# Patient Record
Sex: FEMALE | Race: WHITE | NOT HISPANIC OR LATINO | Employment: FULL TIME | URBAN - METROPOLITAN AREA
[De-identification: names, ages, dates, MRNs, and addresses within clinical notes are randomized per-mention and may not be internally consistent; named-entity substitution may affect disease eponyms.]

---

## 2017-11-10 ENCOUNTER — APPOINTMENT (OUTPATIENT)
Dept: EMPLOYEE HEALTH | Facility: CLINIC | Age: 32
End: 2017-11-10
Payer: COMMERCIAL

## 2017-11-10 DIAGNOSIS — Z77.21 EXPOSURE TO BLOOD: Primary | ICD-10-CM

## 2017-11-10 LAB
HBV SURFACE AB SER QL: 14.2 MIU/ML
HIV 1+2 AB+HIV1 P24 AG SERPL QL IA: NORMAL

## 2017-11-10 PROCEDURE — 36415 COLL VENOUS BLD VENIPUNCTURE: CPT | Mod: EHP

## 2017-11-10 PROCEDURE — 87389 HIV-1 AG W/HIV-1&-2 AB AG IA: CPT | Mod: EHP

## 2017-11-10 PROCEDURE — 86706 HEP B SURFACE ANTIBODY: CPT | Mod: EHP

## 2018-01-12 ENCOUNTER — OFFICE VISIT (OUTPATIENT)
Dept: URGENT CARE | Facility: URGENT CARE | Age: 33
End: 2018-01-12
Payer: COMMERCIAL

## 2018-01-12 VITALS
RESPIRATION RATE: 16 BRPM | WEIGHT: 219 LBS | SYSTOLIC BLOOD PRESSURE: 142 MMHG | OXYGEN SATURATION: 97 % | DIASTOLIC BLOOD PRESSURE: 90 MMHG | TEMPERATURE: 97.3 F | HEART RATE: 98 BPM | BODY MASS INDEX: 37.39 KG/M2 | HEIGHT: 64 IN

## 2018-01-12 DIAGNOSIS — J32.9 BACTERIAL SINUSITIS: Primary | ICD-10-CM

## 2018-01-12 DIAGNOSIS — B96.89 BACTERIAL SINUSITIS: Primary | ICD-10-CM

## 2018-01-12 PROCEDURE — 99202 OFFICE O/P NEW SF 15 MIN: CPT | Mod: RUC | Performed by: NURSE PRACTITIONER

## 2018-01-12 RX ORDER — OMEPRAZOLE 20 MG/1
20 CAPSULE, DELAYED RELEASE ORAL DAILY
COMMUNITY
End: 2019-04-23 | Stop reason: ALTCHOICE

## 2018-01-12 RX ORDER — AMOXICILLIN AND CLAVULANATE POTASSIUM 875; 125 MG/1; MG/1
1 TABLET, FILM COATED ORAL 2 TIMES DAILY
Qty: 14 TABLET | Refills: 0 | Status: SHIPPED | OUTPATIENT
Start: 2018-01-12 | End: 2018-01-19

## 2018-01-12 ASSESSMENT — ENCOUNTER SYMPTOMS
FEVER: 0
COUGH: 1
CHILLS: 1
WHEEZING: 0
SORE THROAT: 1
SHORTNESS OF BREATH: 0
DIARRHEA: 0
NAUSEA: 0
CHEST TIGHTNESS: 1
VOMITING: 0
SINUS PRESSURE: 1

## 2018-01-12 ASSESSMENT — PAIN SCALES - GENERAL: PAINLEVEL: 5

## 2018-01-12 NOTE — PROGRESS NOTES
"Subjective      HPI    Marycruz Locke is a 32 y.o. female who presents for sinus congestion, headaches, and cough ×2 weeks.      Review of Systems   Constitutional: Positive for chills. Negative for fever.   HENT: Positive for congestion, postnasal drip, sinus pressure and sore throat. Negative for ear pain.    Respiratory: Positive for cough and chest tightness. Negative for shortness of breath and wheezing.    Cardiovascular: Negative for chest pain.   Gastrointestinal: Negative for diarrhea, nausea and vomiting.         Objective   /90 (BP Location: Left arm, Patient Position: Sitting, Cuff Size: Reg)   Pulse 98   Temp 36.3 °C (97.3 °F) (Temporal)   Resp 16   Ht 1.626 m (5' 4\")   Wt 99.3 kg (219 lb)   SpO2 97%   BMI 37.59 kg/m²     Physical Exam   Constitutional: She is oriented to person, place, and time. She appears well-developed and well-nourished. No distress.   HENT:   Head: Normocephalic and atraumatic.   Right Ear: External ear normal.   Left Ear: External ear normal.   Mouth/Throat: Uvula is midline and mucous membranes are normal. Uvula swelling present. Posterior oropharyngeal erythema present. No oropharyngeal exudate.   Eyes: Conjunctivae are normal. Pupils are equal, round, and reactive to light.   Neck: Normal range of motion.   Cardiovascular: Normal rate, regular rhythm and normal heart sounds.    Pulmonary/Chest: Effort normal and breath sounds normal.   Abdominal: Soft. Bowel sounds are normal.   Musculoskeletal: Normal range of motion.   Lymphadenopathy:     She has no cervical adenopathy.   Neurological: She is alert and oriented to person, place, and time.   Skin: Skin is warm.       No results found for this or any previous visit (from the past 4 hour(s)).      Assessment/Plan   Diagnoses and all orders for this visit:    Bacterial sinusitis  -     amoxicillin-pot clavulanate (AUGMENTIN) 875-125 mg per tablet; Take 1 tablet by mouth 2 (two) times a day for 7 " days.        Discussion:     Take antibiotic as prescribed. Use over the counter tylenol or ibuprofen for fever and discomfort.  May use saline rinses for congestion, hot steaming showers, and humidifier at nighttime.  Return to urgent care should you develop shortness of breath, chest pain with cough, inability to tolerate fluids, or worseing symptoms or concerns.  Patient agrees with plan.  Patient verbalized understanding and denies any  further questions or concerns at this time.        A voice recognition program was used to aid in medical record documentation. Sometimes words are printed not exactly as they were spoken. While efforts were made to carefully edit and correct any inaccuracies, some errors may be present and should be taken within the context of the discussion.  Please contact our office if you need assistance interpreting this medical record or notice any mistakes.      Jennifer G Franke, CNP

## 2018-03-02 ENCOUNTER — OFFICE VISIT (OUTPATIENT)
Dept: URGENT CARE | Facility: URGENT CARE | Age: 33
End: 2018-03-02
Payer: COMMERCIAL

## 2018-03-02 VITALS
RESPIRATION RATE: 18 BRPM | DIASTOLIC BLOOD PRESSURE: 70 MMHG | TEMPERATURE: 98.5 F | HEART RATE: 65 BPM | HEIGHT: 64 IN | BODY MASS INDEX: 35.85 KG/M2 | WEIGHT: 210 LBS | OXYGEN SATURATION: 100 % | SYSTOLIC BLOOD PRESSURE: 121 MMHG

## 2018-03-02 DIAGNOSIS — R68.89 INFLUENZA-LIKE SYMPTOMS: Primary | ICD-10-CM

## 2018-03-02 LAB
FLUAV AG NPH QL IA: NEGATIVE
FLUBV AG NPH QL IA: NEGATIVE

## 2018-03-02 PROCEDURE — 99213 OFFICE O/P EST LOW 20 MIN: CPT | Performed by: NURSE PRACTITIONER

## 2018-03-02 PROCEDURE — 87804 INFLUENZA ASSAY W/OPTIC: CPT | Mod: QW,59 | Performed by: NURSE PRACTITIONER

## 2018-03-02 ASSESSMENT — ENCOUNTER SYMPTOMS
MYALGIAS: 1
SORE THROAT: 0
SHORTNESS OF BREATH: 0
WHEEZING: 0
NAUSEA: 0
SINUS PRESSURE: 0
DIARRHEA: 0
COUGH: 1
CHILLS: 0
FEVER: 1
VOMITING: 0

## 2018-03-02 ASSESSMENT — PAIN SCALES - GENERAL: PAINLEVEL: 8

## 2018-03-02 NOTE — PROGRESS NOTES
"Subjective      HPI    Marycruz Locke is a 33 y.o. female who presents for fever ×1 day.  Admits to cough during the day.  Denies shortness of breath.  She has been exposed to influenza.      Review of Systems   Constitutional: Positive for fever. Negative for chills.   HENT: Negative for congestion, ear pain, postnasal drip, sinus pressure and sore throat.    Respiratory: Positive for cough. Negative for shortness of breath and wheezing.    Cardiovascular: Negative for chest pain.   Gastrointestinal: Negative for diarrhea, nausea and vomiting.   Musculoskeletal: Positive for myalgias.         Objective   /70 (BP Location: Right arm, Patient Position: Sitting, Cuff Size: Reg)   Pulse 65   Temp 36.9 °C (98.5 °F) (Temporal)   Resp 18   Ht 1.626 m (5' 4\")   Wt 95.3 kg (210 lb)   SpO2 100%   BMI 36.05 kg/m²     Physical Exam   Constitutional: She is oriented to person, place, and time. She appears well-developed and well-nourished. No distress.   HENT:   Head: Normocephalic and atraumatic.   Right Ear: External ear normal.   Left Ear: External ear normal.   Mouth/Throat: Uvula is midline and mucous membranes are normal. No oropharyngeal exudate or posterior oropharyngeal erythema.   Eyes: Conjunctivae are normal. Pupils are equal, round, and reactive to light.   Neck: Normal range of motion.   Cardiovascular: Normal rate, regular rhythm and normal heart sounds.    Pulmonary/Chest: Effort normal and breath sounds normal.   Abdominal: Soft. Bowel sounds are normal.   Musculoskeletal: Normal range of motion.   Lymphadenopathy:     She has no cervical adenopathy.   Neurological: She is alert and oriented to person, place, and time.   Skin: Skin is warm.   Nursing note and vitals reviewed.      No results found for this or any previous visit (from the past 4 hour(s)).    Assessment/Plan   Diagnoses and all orders for this visit:    Influenza-like symptoms  -     Rapid influenza A/B antigens        Discussion: "   Discussed with patient this appear to be viral.  Use over the counter tylenol or ibuprofen for fever and discomfort.  Sudafed during the day and Benadryl at nighttime for sinus congestion if not contraindicated.  May use saline rinses for congestion, hot steaming showers, and humidifier at nighttime.  Return to urgent care should you develop a fever greater than 101, shortness of breath, chest pain with cough, inability to tolerate fluids, or worseing symptoms or concerns.  Patient agrees with plan.  Patient verbalized understanding and denies any  further questions or concerns at this time.           Jennifer G Franke, CNP

## 2018-03-02 NOTE — LETTER
[facility]  [facility] [address]  [phone]  Dept: [phone]  March 2, 2018     Patient: Marycruz Locke   YOB: 1985   Date of Visit: 3/2/2018       To Whom It May Concern:    It is my medical opinion that Marycruz Locke Was seen in urgent care today.  Please excuse patient until fever free ×24 hours..    If you have any questions or concerns, please don't hesitate to call.         Sincerely,        Jennifer G Franke, CNP    CC: No Recipients

## 2018-03-24 ENCOUNTER — OFFICE VISIT (OUTPATIENT)
Dept: URGENT CARE | Facility: URGENT CARE | Age: 33
End: 2018-03-24
Payer: COMMERCIAL

## 2018-03-24 VITALS
RESPIRATION RATE: 18 BRPM | HEART RATE: 67 BPM | SYSTOLIC BLOOD PRESSURE: 127 MMHG | WEIGHT: 214 LBS | DIASTOLIC BLOOD PRESSURE: 79 MMHG | BODY MASS INDEX: 36.73 KG/M2 | TEMPERATURE: 97.4 F | OXYGEN SATURATION: 98 %

## 2018-03-24 DIAGNOSIS — O21.9 NAUSEA AND VOMITING DURING PREGNANCY: Primary | ICD-10-CM

## 2018-03-24 PROCEDURE — 99213 OFFICE O/P EST LOW 20 MIN: CPT | Performed by: NURSE PRACTITIONER

## 2018-03-24 RX ORDER — METOCLOPRAMIDE 5 MG/1
5 TABLET ORAL 4 TIMES DAILY
Qty: 40 TABLET | Refills: 0 | Status: SHIPPED | OUTPATIENT
Start: 2018-03-24 | End: 2018-04-03

## 2018-03-24 ASSESSMENT — ENCOUNTER SYMPTOMS
WOUND: 0
HEADACHES: 1
VOMITING: 1
COUGH: 0
FLANK PAIN: 0
SHORTNESS OF BREATH: 0
DYSURIA: 0
NUMBNESS: 0
DIZZINESS: 0
CONSTIPATION: 1
WEAKNESS: 0
COLOR CHANGE: 0
FEVER: 0
PALPITATIONS: 0
CHILLS: 0
FREQUENCY: 0
ABDOMINAL PAIN: 0
DIARRHEA: 0
BLOOD IN STOOL: 0
NAUSEA: 1

## 2018-03-24 ASSESSMENT — PAIN SCALES - GENERAL: PAINLEVEL: 1

## 2018-03-24 NOTE — PROGRESS NOTES
[place]      KIRIT Locke is a 33 y.o. female who presents for nausea and vomiting ×1 week.  Patient states she is 16 weeks pregnant and starting her second trimester had relief of her nausea.  Approximately 1 week ago patient states she developed nausea and vomiting that is intermittent.  Denies any abdominal pain.  Admits to intermittent constipation but denies diarrhea.  States she has been taking Zofran as needed with minimal relief.  Has been able to eat small bland meals and tolerate fluids.  She states she has noticed she cannot tolerate his water.      Review of Systems   Constitutional: Negative for chills and fever.   Respiratory: Negative for cough and shortness of breath.    Cardiovascular: Negative for chest pain and palpitations.   Gastrointestinal: Positive for constipation, nausea and vomiting. Negative for abdominal pain, blood in stool and diarrhea.   Genitourinary: Negative for dysuria, flank pain, frequency, pelvic pain and urgency.   Skin: Negative for color change, pallor, rash and wound.   Neurological: Positive for headaches. Negative for dizziness, syncope, weakness and numbness.         Objective   /79 (BP Location: Right arm, Patient Position: Sitting, Cuff Size: Reg)   Pulse 67   Temp 36.3 °C (97.4 °F) (Temporal)   Resp 18   Wt 97.1 kg (214 lb)   LMP  (Within Weeks)   SpO2 98%   BMI 36.73 kg/m²     Physical Exam   Constitutional: She is oriented to person, place, and time. She appears well-developed and well-nourished. No distress.   HENT:   Head: Normocephalic and atraumatic.   Eyes: Pupils are equal, round, and reactive to light.   Neck: Normal range of motion. Neck supple.   Cardiovascular: Normal rate, regular rhythm, normal heart sounds and intact distal pulses.    No murmur heard.  Pulmonary/Chest: Effort normal and breath sounds normal.   Abdominal: Soft. Normal appearance and bowel sounds are normal. She exhibits no distension, no abdominal bruit and no  mass. There is no hepatosplenomegaly. There is no tenderness. There is no rebound, no guarding, no CVA tenderness, no tenderness at McBurney's point and negative Ayala's sign. No hernia.   Musculoskeletal: Normal range of motion.   Lymphadenopathy:     She has no cervical adenopathy.   Neurological: She is alert and oriented to person, place, and time.   Skin: Skin is warm and dry. No rash noted. She is not diaphoretic. No erythema.   Psychiatric: She has a normal mood and affect. Her behavior is normal. Judgment and thought content normal.   Nursing note and vitals reviewed.      No results found for this or any previous visit (from the past 4 hour(s)).    Assessment/Plan   Diagnoses and all orders for this visit:    Nausea and vomiting during pregnancy  -     metoclopramide (REGLAN) 5 mg tablet; Take 1 tablet (5 mg total) by mouth 4 (four) times a day for 10 days.        Discussion:   Discussed with patient may trial Reglan.  She is to follow-up with her OB/GYN Dr. Miranda on Monday.  Education provided to consider small frequent meals and to avoid milk products, ice cold drinks, and spicy foods.  Encourage patient to have tenzin crackers that of the bed to eat 1 prior to getting up.  Encourage patient to push fluids and return to urgent care or emergency department should she develop intractable vomiting, abdominal pain, worsening symptoms or concerns.  Patient agrees with plan.  Patient verbalized understanding and denies any  further questions or concerns at this time.           Jennifer G Franke, CNP

## 2018-03-24 NOTE — LETTER
[facility]  [address]  [facility] [address]  [phone]  Dept: [phone]  March 24, 2018     Patient: Marycruz Locke   YOB: 1985   Date of Visit: 3/24/2018       To Whom It May Concern:    It is my medical opinion that Marycruz Locke should remain out of work until 03/25/18 if continued nausea and vomiting should occur..    If you have any questions or concerns, please don't hesitate to call.         Sincerely,        Jennifer G Franke, CNP    CC: No Recipients

## 2018-03-26 LAB
ABO GROUP BLD: NORMAL
D AG BLD QL: POSITIVE
EXTERNAL ANTIBODY SCREEN: NEGATIVE
EXTERNAL HEPATITIS B SURFACE ANTIGEN: NONREACTIVE
EXTERNAL HIV 1+2 AB + HIV1 P24 AG (PRESENCE) IN SERUM: NONREACTIVE
EXTERNAL HIV 1+2 AB/P24 AG SERUM: NONREACTIVE
EXTERNAL RUBELLA ANTIBODY, IGG: NORMAL

## 2018-06-01 ENCOUNTER — HOSPITAL ENCOUNTER (OUTPATIENT)
Facility: HOSPITAL | Age: 33
Discharge: HOME | End: 2018-06-01
Attending: OBSTETRICS & GYNECOLOGY | Admitting: OBSTETRICS & GYNECOLOGY
Payer: COMMERCIAL

## 2018-06-01 VITALS
OXYGEN SATURATION: 99 % | HEART RATE: 57 BPM | SYSTOLIC BLOOD PRESSURE: 108 MMHG | TEMPERATURE: 98.4 F | RESPIRATION RATE: 20 BRPM | DIASTOLIC BLOOD PRESSURE: 63 MMHG

## 2018-06-01 LAB
ALBUMIN SERPL-MCNC: 3.2 G/DL (ref 3.5–5.3)
ALP SERPL-CCNC: 80 U/L (ref 37–98)
ALT SERPL-CCNC: 20 U/L (ref 0–52)
ANION GAP SERPL CALC-SCNC: 9 MMOL/L (ref 3–11)
AST SERPL-CCNC: 18 U/L (ref 0–39)
BACTERIA #/AREA URNS AUTO: NORMAL /HPF
BILIRUB SERPL-MCNC: 0.28 MG/DL (ref 0–1.4)
BILIRUB UR QL STRIP.AUTO: NEGATIVE
BUN SERPL-MCNC: 6 MG/DL (ref 7–25)
CALCIUM ALBUM COR SERPL-MCNC: 9.1 MG/DL (ref 8.5–10.1)
CALCIUM SERPL-MCNC: 8.5 MG/DL (ref 8.6–10.3)
CHLORIDE SERPL-SCNC: 107 MMOL/L (ref 98–107)
CLARITY UR: CLEAR
CO2 SERPL-SCNC: 22 MMOL/L (ref 21–32)
COLOR UR: NORMAL
CREAT SERPL-MCNC: 0.5 MG/DL (ref 0.6–1.2)
ERYTHROCYTE [DISTWIDTH] IN BLOOD BY AUTOMATED COUNT: 14.2 % (ref 11.5–14)
GFR SERPL CREATININE-BSD FRML MDRD: 144 ML/MIN/1.73M*2
GLUCOSE SERPL-MCNC: 85 MG/DL (ref 70–105)
GLUCOSE UR STRIP.AUTO-MCNC: NEGATIVE MG/DL
HCT VFR BLD AUTO: 33.2 % (ref 34–45)
HGB BLD-MCNC: 11.8 G/DL (ref 11.5–15.5)
HGB UR QL STRIP.AUTO: NEGATIVE
KETONES UR STRIP.AUTO-MCNC: NEGATIVE MG/DL
LEUKOCYTE ESTERASE UR QL STRIP: NEGATIVE
MCH RBC QN AUTO: 30.7 PG (ref 28–33)
MCHC RBC AUTO-ENTMCNC: 35.4 G/DL (ref 32–36)
MCV RBC AUTO: 86.8 FL (ref 81–97)
NITRITE UR QL STRIP.AUTO: NEGATIVE
PH UR STRIP.AUTO: 7 PH
PLATELET # BLD AUTO: 179 10*3/UL (ref 140–350)
PMV BLD AUTO: 10.1 FL (ref 6.9–10.8)
POTASSIUM SERPL-SCNC: 3.9 MMOL/L (ref 3.5–5.1)
PROT SERPL-MCNC: 5.8 G/DL (ref 6–8.3)
PROT UR STRIP.AUTO-MCNC: NEGATIVE MG/DL
RBC # BLD AUTO: 3.83 10*6/ΜL (ref 3.7–5.3)
RBC #/AREA URNS AUTO: NEGATIVE /HPF
SODIUM SERPL-SCNC: 138 MMOL/L (ref 135–145)
SP GR UR STRIP.AUTO: 1 (ref 1–1.03)
SQUAMOUS #/AREA URNS AUTO: NEGATIVE /HPF
UROBILINOGEN UR STRIP.AUTO-MCNC: <2 E.U./DL
WBC # BLD AUTO: 9.7 10*3/UL (ref 4.5–10.5)
WBC #/AREA URNS AUTO: NORMAL /HPF

## 2018-06-01 PROCEDURE — 2580000300 HC RX 258: Performed by: OBSTETRICS & GYNECOLOGY

## 2018-06-01 PROCEDURE — 99213 OFFICE O/P EST LOW 20 MIN: CPT

## 2018-06-01 PROCEDURE — 80053 COMPREHEN METABOLIC PANEL: CPT | Performed by: OBSTETRICS & GYNECOLOGY

## 2018-06-01 PROCEDURE — 85027 COMPLETE CBC AUTOMATED: CPT | Performed by: OBSTETRICS & GYNECOLOGY

## 2018-06-01 PROCEDURE — 81001 URINALYSIS AUTO W/SCOPE: CPT | Performed by: OBSTETRICS & GYNECOLOGY

## 2018-06-01 PROCEDURE — 36415 COLL VENOUS BLD VENIPUNCTURE: CPT | Performed by: OBSTETRICS & GYNECOLOGY

## 2018-06-01 PROCEDURE — 6370000100 HC RX 637 (ALT 250 FOR IP): Performed by: OBSTETRICS & GYNECOLOGY

## 2018-06-01 PROCEDURE — 6360000200 HC RX 636 W HCPCS (ALT 250 FOR IP): Performed by: OBSTETRICS & GYNECOLOGY

## 2018-06-01 PROCEDURE — 96374 THER/PROPH/DIAG INJ IV PUSH: CPT

## 2018-06-01 PROCEDURE — G0463 HOSPITAL OUTPT CLINIC VISIT: HCPCS | Mod: 25

## 2018-06-01 RX ORDER — ONDANSETRON HYDROCHLORIDE 2 MG/ML
4 INJECTION, SOLUTION INTRAVENOUS ONCE
Status: COMPLETED | OUTPATIENT
Start: 2018-06-01 | End: 2018-06-01

## 2018-06-01 RX ORDER — FAMOTIDINE 20 MG/1
20 TABLET, FILM COATED ORAL ONCE
Status: COMPLETED | OUTPATIENT
Start: 2018-06-01 | End: 2018-06-01

## 2018-06-01 RX ORDER — ACETAMINOPHEN 500 MG
1000 TABLET ORAL EVERY 6 HOURS PRN
Status: DISCONTINUED | OUTPATIENT
Start: 2018-06-01 | End: 2018-06-01 | Stop reason: HOSPADM

## 2018-06-01 RX ORDER — SODIUM CHLORIDE, SODIUM LACTATE, POTASSIUM CHLORIDE, AND CALCIUM CHLORIDE .6; .31; .03; .02 G/100ML; G/100ML; G/100ML; G/100ML
1000 INJECTION, SOLUTION INTRAVENOUS ONCE
Status: COMPLETED | OUTPATIENT
Start: 2018-06-01 | End: 2018-06-01

## 2018-06-01 RX ADMIN — SODIUM CHLORIDE, POTASSIUM CHLORIDE, SODIUM LACTATE AND CALCIUM CHLORIDE 1000 ML: 600; 310; 30; 20 INJECTION, SOLUTION INTRAVENOUS at 08:13

## 2018-06-01 RX ADMIN — FAMOTIDINE 20 MG: 20 TABLET ORAL at 08:30

## 2018-06-01 RX ADMIN — ONDANSETRON HYDROCHLORIDE 4 MG: 2 INJECTION INTRAMUSCULAR; INTRAVENOUS at 08:11

## 2018-06-01 RX ADMIN — Medication 1000 MG: at 08:49

## 2018-06-01 NOTE — DISCHARGE INSTRUCTIONS
You should return to the hospital or contact your provider for any of the following:    · You have noticed a decrease in your baby's activity level, less than 5 movements in one hour after 24 weeks.    · You are bleeding from the vagina. You will have a small amount of bloody discharge after a vaginal exam. This is normal.    · You have a gush of fluid or are leaking fluid.    · Foul smelling vaginal discharge.    · Regular, painful, uterine contractions, lasting 45-60 seconds, every 3-4 minutes if you are more than 37 weeks.    · If less than 37 weeks and having 4 or more contractions per hour.     · Severe headache, blurred vision, spots in front of your eyes.    · Sudden weight gain or swelling in your face, hands, or feet.    · Persistent discomfort across your upper abdomen.    · Burning when you urinate.  Increased frequency or urgent sensation to urinate.    · You have a temperature of 100.5 degrees Fahrenheit or greater.    · Nausea or vomiting for more than 24 hours and unable to keep down fluids.    · Your abdomen becomes hard and does not relax.    · Severe abdominal pain.    · If you experience any abdominal trauma.    Zofran as needed.     Pepcid or Heartburn medications over the counter as needed.     BRAT (bananas, rice, applesauce and toast)    Oral fluids    Follow up with Labor room this weekend or Clinic Monday if Nausea, vomitting and HA do not improve.

## 2018-06-18 LAB — EXTERNAL GLUCOSE MG/DL IN SER/PLAS GESTATIONAL SCREEN: 135 MG/DL (ref 70–140)

## 2018-08-01 ENCOUNTER — HOSPITAL ENCOUNTER (OUTPATIENT)
Facility: HOSPITAL | Age: 33
Discharge: HOME | End: 2018-08-01
Attending: OBSTETRICS & GYNECOLOGY | Admitting: OBSTETRICS & GYNECOLOGY
Payer: COMMERCIAL

## 2018-08-01 VITALS
WEIGHT: 229.28 LBS | SYSTOLIC BLOOD PRESSURE: 123 MMHG | HEART RATE: 64 BPM | OXYGEN SATURATION: 98 % | BODY MASS INDEX: 40.62 KG/M2 | HEIGHT: 63 IN | DIASTOLIC BLOOD PRESSURE: 71 MMHG

## 2018-08-01 LAB
ALBUMIN SERPL-MCNC: 3.1 G/DL (ref 3.5–5.3)
ALP SERPL-CCNC: 109 U/L (ref 37–98)
ALT SERPL-CCNC: 8 U/L (ref 0–52)
ANION GAP SERPL CALC-SCNC: 9 MMOL/L (ref 3–11)
AST SERPL-CCNC: 11 U/L (ref 0–39)
BILIRUB SERPL-MCNC: 0.36 MG/DL (ref 0–1.4)
BUN SERPL-MCNC: 7 MG/DL (ref 7–25)
CALCIUM ALBUM COR SERPL-MCNC: 9.5 MG/DL (ref 8.5–10.1)
CALCIUM SERPL-MCNC: 8.8 MG/DL (ref 8.6–10.3)
CHLORIDE SERPL-SCNC: 106 MMOL/L (ref 98–107)
CO2 SERPL-SCNC: 19 MMOL/L (ref 21–32)
CREAT SERPL-MCNC: 0.5 MG/DL (ref 0.6–1.2)
D DIMER PPP FEU-MCNC: 0.31 UG/ML FEU (ref 0–0.5)
ERYTHROCYTE [DISTWIDTH] IN BLOOD BY AUTOMATED COUNT: 13.9 % (ref 11.5–14)
GFR SERPL CREATININE-BSD FRML MDRD: 144 ML/MIN/1.73M*2
GLUCOSE SERPL-MCNC: 121 MG/DL (ref 70–105)
HCT VFR BLD AUTO: 35.1 % (ref 34–45)
HGB BLD-MCNC: 12.4 G/DL (ref 11.5–15.5)
MCH RBC QN AUTO: 30.4 PG (ref 28–33)
MCHC RBC AUTO-ENTMCNC: 35.4 G/DL (ref 32–36)
MCV RBC AUTO: 85.8 FL (ref 81–97)
PLATELET # BLD AUTO: 161 10*3/UL (ref 140–350)
PMV BLD AUTO: 10.7 FL (ref 6.9–10.8)
POTASSIUM SERPL-SCNC: 3.5 MMOL/L (ref 3.5–5.1)
PROT SERPL-MCNC: 6 G/DL (ref 6–8.3)
RBC # BLD AUTO: 4.09 10*6/ΜL (ref 3.7–5.3)
SODIUM SERPL-SCNC: 134 MMOL/L (ref 135–145)
WBC # BLD AUTO: 10.2 10*3/UL (ref 4.5–10.5)

## 2018-08-01 PROCEDURE — 85379 FIBRIN DEGRADATION QUANT: CPT | Performed by: OBSTETRICS & GYNECOLOGY

## 2018-08-01 PROCEDURE — 80053 COMPREHEN METABOLIC PANEL: CPT | Performed by: OBSTETRICS & GYNECOLOGY

## 2018-08-01 PROCEDURE — 85027 COMPLETE CBC AUTOMATED: CPT | Performed by: OBSTETRICS & GYNECOLOGY

## 2018-08-01 PROCEDURE — 2580000300 HC RX 258: Performed by: OBSTETRICS & GYNECOLOGY

## 2018-08-01 PROCEDURE — 99214 OFFICE O/P EST MOD 30 MIN: CPT | Performed by: OBSTETRICS & GYNECOLOGY

## 2018-08-01 PROCEDURE — 99213 OFFICE O/P EST LOW 20 MIN: CPT

## 2018-08-01 RX ORDER — SODIUM CHLORIDE, SODIUM LACTATE, POTASSIUM CHLORIDE, AND CALCIUM CHLORIDE .6; .31; .03; .02 G/100ML; G/100ML; G/100ML; G/100ML
1000 INJECTION, SOLUTION INTRAVENOUS ONCE
Status: COMPLETED | OUTPATIENT
Start: 2018-08-01 | End: 2018-08-01

## 2018-08-01 RX ADMIN — SODIUM CHLORIDE, POTASSIUM CHLORIDE, SODIUM LACTATE AND CALCIUM CHLORIDE 1000 ML: 600; 310; 30; 20 INJECTION, SOLUTION INTRAVENOUS at 10:15

## 2018-08-01 NOTE — DISCHARGE INSTRUCTIONS
You should return to the hospital or contact your provider for any of the following:    · You have noticed a decrease in your baby's activity level, less than 5 movements in one hour after 24 weeks.    · You are bleeding from the vagina. You will have a small amount of bloody discharge after a vaginal exam. This is normal.    · You have a gush of fluid or are leaking fluid.    · Foul smelling vaginal discharge.    · Regular, painful, uterine contractions, lasting 45-60 seconds, every 3-4 minutes if you are more than 37 weeks.    · If less than 37 weeks and having 4 or more contractions per hour.     · Severe headache, blurred vision, spots in front of your eyes.    · Sudden weight gain or swelling in your face, hands, or feet.    · Persistent discomfort across your upper abdomen.    · Burning when you urinate.  Increased frequency or urgent sensation to urinate.    · You have a temperature of 100.5 degrees Fahrenheit or greater.    · Nausea or vomiting for more than 24 hours and unable to keep down fluids.    · Your abdomen becomes hard and does not relax.    · Severe abdominal pain.    · If you experience any abdominal trauma.

## 2018-08-16 LAB — EXTERNAL GROUP B STREP DNA: NEGATIVE

## 2018-09-07 NOTE — PRE-PROCEDURE INSTRUCTIONS
Pre-Surgery Instructions:   Medication Instructions   • omeprazole (PriLOSEC) 20 mg capsule Take morning of surgery/procedure   • SE-CRISSY 19, WITH DOCUSATE, 29 mg iron- 1 mg-25 mg tablet Do not take morning of surgery/procedure

## 2018-09-11 ENCOUNTER — HOSPITAL ENCOUNTER (INPATIENT)
Facility: HOSPITAL | Age: 33
LOS: 2 days | Discharge: HOME | End: 2018-09-13
Attending: OBSTETRICS & GYNECOLOGY | Admitting: OBSTETRICS & GYNECOLOGY
Payer: COMMERCIAL

## 2018-09-11 ENCOUNTER — ANESTHESIA EVENT (OUTPATIENT)
Dept: OBSTETRICS AND GYNECOLOGY | Facility: HOSPITAL | Age: 33
End: 2018-09-11
Payer: COMMERCIAL

## 2018-09-11 ENCOUNTER — ANESTHESIA (OUTPATIENT)
Dept: OBSTETRICS AND GYNECOLOGY | Facility: HOSPITAL | Age: 33
End: 2018-09-11
Payer: COMMERCIAL

## 2018-09-11 LAB
ABO GROUP (TYPE) IN BLOOD: NORMAL
ANTIBODY SCREEN: NORMAL
D AG BLD QL: NORMAL
ERYTHROCYTE [DISTWIDTH] IN BLOOD BY AUTOMATED COUNT: 14 % (ref 11.5–14)
EXTERNAL CHLAMYDIA TRACHOMATIS DNA PROBE: NORMAL
EXTERNAL NEISSERIA GONORRHOEAE DNA PROBE: NORMAL
HCT VFR BLD AUTO: 35.4 % (ref 34–45)
HGB BLD-MCNC: 12.7 G/DL (ref 11.5–15.5)
MCH RBC QN AUTO: 30.6 PG (ref 28–33)
MCHC RBC AUTO-ENTMCNC: 35.9 G/DL (ref 32–36)
MCV RBC AUTO: 85.2 FL (ref 81–97)
PLATELET # BLD AUTO: 159 10*3/UL (ref 140–350)
PMV BLD AUTO: 10.9 FL (ref 6.9–10.8)
RBC # BLD AUTO: 4.16 10*6/ΜL (ref 3.7–5.3)
T PALLIDUM IGG SER QL IA: NORMAL
WBC # BLD AUTO: 9.6 10*3/UL (ref 4.5–10.5)

## 2018-09-11 PROCEDURE — 59514 CESAREAN DELIVERY ONLY: CPT | Performed by: OBSTETRICS & GYNECOLOGY

## 2018-09-11 PROCEDURE — 2500000200 HC RX 250 WO HCPCS: Performed by: ANESTHESIOLOGY

## 2018-09-11 PROCEDURE — (BLANK) HC RECOVERY PHASE-1 OB EACH ADDITIONAL 1/2  HOUR ACUITY LEVEL 1: Performed by: OBSTETRICS & GYNECOLOGY

## 2018-09-11 PROCEDURE — 6370000100 HC RX 637 (ALT 250 FOR IP): Performed by: ANESTHESIOLOGY

## 2018-09-11 PROCEDURE — 2580000300 HC RX 258: Performed by: OBSTETRICS & GYNECOLOGY

## 2018-09-11 PROCEDURE — 86780 TREPONEMA PALLIDUM: CPT | Performed by: OBSTETRICS & GYNECOLOGY

## 2018-09-11 PROCEDURE — S0028 INJECTION, FAMOTIDINE, 20 MG: HCPCS | Performed by: ANESTHESIOLOGY

## 2018-09-11 PROCEDURE — 2590000100 HC RX 259: Performed by: OBSTETRICS & GYNECOLOGY

## 2018-09-11 PROCEDURE — 6360000200 HC RX 636 W HCPCS (ALT 250 FOR IP): Performed by: NURSE ANESTHETIST, CERTIFIED REGISTERED

## 2018-09-11 PROCEDURE — (BLANK) HC RECOVERY PHASE-1 OB 1ST HOUR ACUITY LEVEL 1: Performed by: OBSTETRICS & GYNECOLOGY

## 2018-09-11 PROCEDURE — 6360000200 HC RX 636 W HCPCS (ALT 250 FOR IP): Performed by: OBSTETRICS & GYNECOLOGY

## 2018-09-11 PROCEDURE — 2580000300 HC RX 258: Performed by: NURSE ANESTHETIST, CERTIFIED REGISTERED

## 2018-09-11 PROCEDURE — 85027 COMPLETE CBC AUTOMATED: CPT | Performed by: OBSTETRICS & GYNECOLOGY

## 2018-09-11 PROCEDURE — 86900 BLOOD TYPING SEROLOGIC ABO: CPT | Performed by: OBSTETRICS & GYNECOLOGY

## 2018-09-11 PROCEDURE — 01961 ANES CESAREAN DELIVERY ONLY: CPT | Performed by: NURSE ANESTHETIST, CERTIFIED REGISTERED

## 2018-09-11 PROCEDURE — 6360000200 HC RX 636 W HCPCS (ALT 250 FOR IP): Performed by: ANESTHESIOLOGY

## 2018-09-11 PROCEDURE — 36415 COLL VENOUS BLD VENIPUNCTURE: CPT | Performed by: OBSTETRICS & GYNECOLOGY

## 2018-09-11 PROCEDURE — (BLANK) HC ROOM PRIVATE OBSTETRICS

## 2018-09-11 PROCEDURE — 2580000300 HC RX 258: Performed by: ANESTHESIOLOGY

## 2018-09-11 RX ORDER — OXYTOCIN/0.9 % SODIUM CHLORIDE 30/500 ML
250 PLASTIC BAG, INJECTION (ML) INTRAVENOUS CONTINUOUS
Status: DISPENSED | OUTPATIENT
Start: 2018-09-11 | End: 2018-09-11

## 2018-09-11 RX ORDER — MORPHINE SULFATE 2 MG/ML
4 INJECTION, SOLUTION INTRAMUSCULAR; INTRAVENOUS
Status: DISPENSED | OUTPATIENT
Start: 2018-09-11 | End: 2018-09-12

## 2018-09-11 RX ORDER — MORPHINE SULFATE 2 MG/ML
4 INJECTION, SOLUTION INTRAMUSCULAR; INTRAVENOUS
Status: ACTIVE | OUTPATIENT
Start: 2018-09-11 | End: 2018-09-12

## 2018-09-11 RX ORDER — SODIUM CHLORIDE, SODIUM LACTATE, POTASSIUM CHLORIDE, AND CALCIUM CHLORIDE .6; .31; .03; .02 G/100ML; G/100ML; G/100ML; G/100ML
1000 INJECTION, SOLUTION INTRAVENOUS ONCE
Status: COMPLETED | OUTPATIENT
Start: 2018-09-11 | End: 2018-09-11

## 2018-09-11 RX ORDER — OXYCODONE AND ACETAMINOPHEN 5; 325 MG/1; MG/1
2 TABLET ORAL EVERY 4 HOURS PRN
Status: DISCONTINUED | OUTPATIENT
Start: 2018-09-11 | End: 2018-09-13 | Stop reason: HOSPADM

## 2018-09-11 RX ORDER — SODIUM CHLORIDE, SODIUM LACTATE, POTASSIUM CHLORIDE, CALCIUM CHLORIDE 600; 310; 30; 20 MG/100ML; MG/100ML; MG/100ML; MG/100ML
125 INJECTION, SOLUTION INTRAVENOUS CONTINUOUS
Status: DISCONTINUED | OUTPATIENT
Start: 2018-09-11 | End: 2018-09-13 | Stop reason: HOSPADM

## 2018-09-11 RX ORDER — METOCLOPRAMIDE HYDROCHLORIDE 5 MG/ML
10 INJECTION INTRAMUSCULAR; INTRAVENOUS ONCE
Status: COMPLETED | OUTPATIENT
Start: 2018-09-11 | End: 2018-09-11

## 2018-09-11 RX ORDER — SODIUM CHLORIDE 9 MG/ML
500 INJECTION, SOLUTION INTRAVENOUS ONCE AS NEEDED
Status: COMPLETED | OUTPATIENT
Start: 2018-09-11 | End: 2018-09-11

## 2018-09-11 RX ORDER — CEFAZOLIN SODIUM 10 G/1
2000 INJECTION, POWDER, FOR SOLUTION INTRAVENOUS ONCE
Status: DISCONTINUED | OUTPATIENT
Start: 2018-09-11 | End: 2018-09-11 | Stop reason: HOSPADM

## 2018-09-11 RX ORDER — DIPHENHYDRAMINE HYDROCHLORIDE 50 MG/ML
25 INJECTION INTRAMUSCULAR; INTRAVENOUS EVERY 6 HOURS PRN
Status: DISPENSED | OUTPATIENT
Start: 2018-09-11 | End: 2018-09-12

## 2018-09-11 RX ORDER — ONDANSETRON HYDROCHLORIDE 2 MG/ML
INJECTION, SOLUTION INTRAVENOUS AS NEEDED
Status: DISCONTINUED | OUTPATIENT
Start: 2018-09-11 | End: 2018-09-11 | Stop reason: SURG

## 2018-09-11 RX ORDER — CEFAZOLIN SODIUM 1 G/3ML
INJECTION, POWDER, FOR SOLUTION INTRAMUSCULAR; INTRAVENOUS AS NEEDED
Status: DISCONTINUED | OUTPATIENT
Start: 2018-09-11 | End: 2018-09-11 | Stop reason: SURG

## 2018-09-11 RX ORDER — ACETAMINOPHEN 500 MG
500 TABLET ORAL EVERY 4 HOURS PRN
Status: DISCONTINUED | OUTPATIENT
Start: 2018-09-11 | End: 2018-09-13 | Stop reason: HOSPADM

## 2018-09-11 RX ORDER — FAMOTIDINE 10 MG/ML
INJECTION INTRAVENOUS
Status: DISCONTINUED
Start: 2018-09-11 | End: 2018-09-13 | Stop reason: HOSPADM

## 2018-09-11 RX ORDER — SODIUM CITRATE AND CITRIC ACID MONOHYDRATE 334; 500 MG/5ML; MG/5ML
30 SOLUTION ORAL ONCE
Status: COMPLETED | OUTPATIENT
Start: 2018-09-11 | End: 2018-09-11

## 2018-09-11 RX ORDER — ONDANSETRON HYDROCHLORIDE 2 MG/ML
4 INJECTION, SOLUTION INTRAVENOUS EVERY 6 HOURS PRN
Status: DISCONTINUED | OUTPATIENT
Start: 2018-09-11 | End: 2018-09-13 | Stop reason: HOSPADM

## 2018-09-11 RX ORDER — KETOROLAC TROMETHAMINE 30 MG/ML
30 INJECTION, SOLUTION INTRAMUSCULAR; INTRAVENOUS EVERY 6 HOURS
Status: DISPENSED | OUTPATIENT
Start: 2018-09-11 | End: 2018-09-12

## 2018-09-11 RX ORDER — SIMETHICONE 80 MG
80 TABLET,CHEWABLE ORAL EVERY 4 HOURS PRN
Status: DISCONTINUED | OUTPATIENT
Start: 2018-09-11 | End: 2018-09-13 | Stop reason: HOSPADM

## 2018-09-11 RX ORDER — SODIUM CITRATE AND CITRIC ACID MONOHYDRATE 334; 500 MG/5ML; MG/5ML
SOLUTION ORAL
Status: DISCONTINUED
Start: 2018-09-11 | End: 2018-09-13 | Stop reason: HOSPADM

## 2018-09-11 RX ORDER — AMMONIA 15 % (W/V)
1 AMPUL (EA) INHALATION AS NEEDED
Status: DISCONTINUED | OUTPATIENT
Start: 2018-09-11 | End: 2018-09-13 | Stop reason: HOSPADM

## 2018-09-11 RX ORDER — DIPHENHYDRAMINE HYDROCHLORIDE 50 MG/ML
25-50 INJECTION INTRAMUSCULAR; INTRAVENOUS EVERY 6 HOURS PRN
Status: DISCONTINUED | OUTPATIENT
Start: 2018-09-11 | End: 2018-09-13 | Stop reason: HOSPADM

## 2018-09-11 RX ORDER — DOCUSATE SODIUM 100 MG/1
100 CAPSULE, LIQUID FILLED ORAL 2 TIMES DAILY
Status: DISCONTINUED | OUTPATIENT
Start: 2018-09-11 | End: 2018-09-13 | Stop reason: HOSPADM

## 2018-09-11 RX ORDER — FAMOTIDINE 10 MG/ML
20 INJECTION INTRAVENOUS ONCE
Status: COMPLETED | OUTPATIENT
Start: 2018-09-11 | End: 2018-09-11

## 2018-09-11 RX ORDER — DIPHENHYDRAMINE HCL 25 MG
25-50 CAPSULE ORAL EVERY 6 HOURS PRN
Status: DISCONTINUED | OUTPATIENT
Start: 2018-09-11 | End: 2018-09-13 | Stop reason: HOSPADM

## 2018-09-11 RX ORDER — SODIUM CHLORIDE, SODIUM LACTATE, POTASSIUM CHLORIDE, CALCIUM CHLORIDE 600; 310; 30; 20 MG/100ML; MG/100ML; MG/100ML; MG/100ML
250 INJECTION, SOLUTION INTRAVENOUS CONTINUOUS
Status: DISCONTINUED | OUTPATIENT
Start: 2018-09-11 | End: 2018-09-11

## 2018-09-11 RX ORDER — OXYCODONE AND ACETAMINOPHEN 5; 325 MG/1; MG/1
1 TABLET ORAL EVERY 4 HOURS PRN
Status: DISCONTINUED | OUTPATIENT
Start: 2018-09-11 | End: 2018-09-13 | Stop reason: HOSPADM

## 2018-09-11 RX ORDER — ONDANSETRON HYDROCHLORIDE 2 MG/ML
4 INJECTION, SOLUTION INTRAVENOUS EVERY 4 HOURS PRN
Status: ACTIVE | OUTPATIENT
Start: 2018-09-11 | End: 2018-09-12

## 2018-09-11 RX ORDER — MORPHINE SULFATE 1 MG/ML
INJECTION, SOLUTION EPIDURAL; INTRATHECAL; INTRAVENOUS AS NEEDED
Status: DISCONTINUED | OUTPATIENT
Start: 2018-09-11 | End: 2018-09-11 | Stop reason: SURG

## 2018-09-11 RX ORDER — AMMONIA 15 % (W/V)
1 AMPUL (EA) INHALATION AS NEEDED
Status: DISCONTINUED | OUTPATIENT
Start: 2018-09-11 | End: 2018-09-11 | Stop reason: HOSPADM

## 2018-09-11 RX ORDER — DEXAMETHASONE SODIUM PHOSPHATE 4 MG/ML
INJECTION, SOLUTION INTRA-ARTICULAR; INTRALESIONAL; INTRAMUSCULAR; INTRAVENOUS; SOFT TISSUE AS NEEDED
Status: DISCONTINUED | OUTPATIENT
Start: 2018-09-11 | End: 2018-09-11 | Stop reason: SURG

## 2018-09-11 RX ORDER — IBUPROFEN 800 MG/1
800 TABLET ORAL EVERY 8 HOURS SCHEDULED
Status: DISCONTINUED | OUTPATIENT
Start: 2018-09-12 | End: 2018-09-13 | Stop reason: HOSPADM

## 2018-09-11 RX ORDER — METOCLOPRAMIDE HYDROCHLORIDE 5 MG/ML
INJECTION INTRAMUSCULAR; INTRAVENOUS
Status: DISCONTINUED
Start: 2018-09-11 | End: 2018-09-13 | Stop reason: HOSPADM

## 2018-09-11 RX ORDER — MORPHINE SULFATE 1 MG/ML
0.2 INJECTION, SOLUTION EPIDURAL; INTRATHECAL; INTRAVENOUS ONCE
Status: DISCONTINUED | OUTPATIENT
Start: 2018-09-11 | End: 2018-09-13 | Stop reason: HOSPADM

## 2018-09-11 RX ORDER — OXYCODONE AND ACETAMINOPHEN 5; 325 MG/1; MG/1
2 TABLET ORAL EVERY 4 HOURS PRN
Status: ACTIVE | OUTPATIENT
Start: 2018-09-11 | End: 2018-09-12

## 2018-09-11 RX ORDER — METOCLOPRAMIDE HYDROCHLORIDE 5 MG/ML
10 INJECTION INTRAMUSCULAR; INTRAVENOUS EVERY 6 HOURS PRN
Status: DISCONTINUED | OUTPATIENT
Start: 2018-09-11 | End: 2018-09-13 | Stop reason: HOSPADM

## 2018-09-11 RX ORDER — OXYCODONE AND ACETAMINOPHEN 5; 325 MG/1; MG/1
2 TABLET ORAL ONCE AS NEEDED
Status: DISCONTINUED | OUTPATIENT
Start: 2018-09-11 | End: 2018-09-13 | Stop reason: HOSPADM

## 2018-09-11 RX ORDER — CEFAZOLIN SODIUM 10 G/1
INJECTION, POWDER, FOR SOLUTION INTRAVENOUS
Status: DISCONTINUED
Start: 2018-09-11 | End: 2018-09-13 | Stop reason: HOSPADM

## 2018-09-11 RX ORDER — OXYCODONE AND ACETAMINOPHEN 5; 325 MG/1; MG/1
1 TABLET ORAL EVERY 4 HOURS PRN
Status: DISPENSED | OUTPATIENT
Start: 2018-09-11 | End: 2018-09-12

## 2018-09-11 RX ORDER — BUPIVACAINE HYDROCHLORIDE 7.5 MG/ML
INJECTION, SOLUTION INTRASPINAL AS NEEDED
Status: DISCONTINUED | OUTPATIENT
Start: 2018-09-11 | End: 2018-09-11 | Stop reason: SURG

## 2018-09-11 RX ADMIN — SODIUM CITRATE AND CITRIC ACID MONOHYDRATE 30 ML: 500; 334 SOLUTION ORAL at 11:34

## 2018-09-11 RX ADMIN — Medication 100 MCG: at 12:24

## 2018-09-11 RX ADMIN — CEFAZOLIN SODIUM 2 G: 1 INJECTION, POWDER, FOR SOLUTION INTRAMUSCULAR; INTRAVENOUS at 12:09

## 2018-09-11 RX ADMIN — OXYTOCIN-SODIUM CHLORIDE 0.9% IV SOLN 30 UNIT/500ML 250 ML/HR: 30-0.9/5 SOLUTION at 19:43

## 2018-09-11 RX ADMIN — OXYCODONE HYDROCHLORIDE AND ACETAMINOPHEN 1 TABLET: 5; 325 TABLET ORAL at 20:04

## 2018-09-11 RX ADMIN — DOCUSATE SODIUM 100 MG: 100 CAPSULE, LIQUID FILLED ORAL at 19:41

## 2018-09-11 RX ADMIN — METOCLOPRAMIDE 10 MG: 5 INJECTION, SOLUTION INTRAMUSCULAR; INTRAVENOUS at 16:44

## 2018-09-11 RX ADMIN — SODIUM CHLORIDE, POTASSIUM CHLORIDE, SODIUM LACTATE AND CALCIUM CHLORIDE 250 ML/HR: 600; 310; 30; 20 INJECTION, SOLUTION INTRAVENOUS at 11:41

## 2018-09-11 RX ADMIN — SIMETHICONE CHEW TAB 80 MG 80 MG: 80 TABLET ORAL at 19:41

## 2018-09-11 RX ADMIN — SODIUM CHLORIDE 500 ML: 9 INJECTION, SOLUTION INTRAVENOUS at 16:23

## 2018-09-11 RX ADMIN — DIPHENHYDRAMINE HYDROCHLORIDE 25 MG: 50 INJECTION, SOLUTION INTRAMUSCULAR; INTRAVENOUS at 16:45

## 2018-09-11 RX ADMIN — SODIUM CHLORIDE, POTASSIUM CHLORIDE, SODIUM LACTATE AND CALCIUM CHLORIDE 125 ML/HR: 600; 310; 30; 20 INJECTION, SOLUTION INTRAVENOUS at 22:07

## 2018-09-11 RX ADMIN — MORPHINE SULFATE 0.2 MG: 1 INJECTION EPIDURAL; INTRATHECAL; INTRAVENOUS at 12:13

## 2018-09-11 RX ADMIN — MORPHINE SULFATE 4 MG: 2 INJECTION, SOLUTION INTRAMUSCULAR; INTRAVENOUS at 15:59

## 2018-09-11 RX ADMIN — DEXAMETHASONE SODIUM PHOSPHATE 4 MG: 4 INJECTION, SOLUTION INTRAMUSCULAR; INTRAVENOUS at 12:33

## 2018-09-11 RX ADMIN — Medication 100 MCG: at 12:32

## 2018-09-11 RX ADMIN — FAMOTIDINE 20 MG: 10 INJECTION INTRAVENOUS at 11:34

## 2018-09-11 RX ADMIN — Medication 100 MCG: at 12:37

## 2018-09-11 RX ADMIN — Medication 100 MCG: at 12:47

## 2018-09-11 RX ADMIN — METOCLOPRAMIDE 10 MG: 5 INJECTION, SOLUTION INTRAMUSCULAR; INTRAVENOUS at 11:34

## 2018-09-11 RX ADMIN — SODIUM CHLORIDE, POTASSIUM CHLORIDE, SODIUM LACTATE AND CALCIUM CHLORIDE 1000 ML: 600; 310; 30; 20 INJECTION, SOLUTION INTRAVENOUS at 11:10

## 2018-09-11 RX ADMIN — OXYTOCIN-SODIUM CHLORIDE 0.9% IV SOLN 30 UNIT/500ML 250 ML/HR: 30-0.9/5 SOLUTION at 17:00

## 2018-09-11 RX ADMIN — OXYTOCIN 150 ML/HR: 10 INJECTION, SOLUTION INTRAMUSCULAR; INTRAVENOUS at 12:45

## 2018-09-11 RX ADMIN — ONDANSETRON 4 MG: 2 INJECTION INTRAMUSCULAR; INTRAVENOUS at 12:34

## 2018-09-11 RX ADMIN — Medication 100 MCG: at 12:43

## 2018-09-11 RX ADMIN — BUPIVACAINE HYDROCHLORIDE 1.8 ML: 7.5 INJECTION, SOLUTION INTRASPINAL at 12:13

## 2018-09-11 RX ADMIN — Medication 100 MCG: at 12:53

## 2018-09-11 RX ADMIN — OXYTOCIN 999 ML/HR: 10 INJECTION, SOLUTION INTRAMUSCULAR; INTRAVENOUS at 12:29

## 2018-09-11 ASSESSMENT — ACTIVITIES OF DAILY LIVING (ADL)
ADEQUATE_TO_COMPLETE_ADL: YES
PATIENT'S MEMORY ADEQUATE TO SAFELY COMPLETE DAILY ACTIVITIES?: YES

## 2018-09-11 NOTE — ANESTHESIA PREPROCEDURE EVALUATION
"Pre-Procedure Assessment    Patient: Marycruz Locke, female, 33 y.o.    Ht Readings from Last 1 Encounters:   09/11/18 1.626 m (5' 4\")     Wt Readings from Last 1 Encounters:   09/11/18 104 kg (229 lb 4.5 oz)       Last Vitals  BP      Temp      Pulse     Resp      SpO2      Pain Score         Problem list reviewed and Medical history reviewed           Airway   Mallampati: II  TM distance: >3 FB  Neck ROM: full      Dental      Pulmonary     breath sounds clear to auscultation  Cardiovascular     Rhythm: regular  Rate: normal    Mental Status/Neuro/Psych    Pt is alert.        GI/Hepatic/Renal    (+) GERD,     Endo/Other    Abdominal           Social History   Substance Use Topics   • Smoking status: Current Every Day Smoker     Years: 15.00     Types: Cigarettes     Last attempt to quit: 1/7/2018   • Smokeless tobacco: Never Used      Comment: 1 cig/day   • Alcohol use No      Hematology   Lab Results   Component Value Date/Time    WBC 9.6 09/11/2018 11:03 AM    RBC 4.16 09/11/2018 11:03 AM    MCV 85.2 09/11/2018 11:03 AM    HGB 12.7 09/11/2018 11:03 AM    HCT 35.4 09/11/2018 11:03 AM     09/11/2018 11:03 AM      Coagulation No results found for: PT, APTT, INR   General Chemistry   Lab Results   Component Value Date/Time    CALCIUM 8.8 08/01/2018 10:20 AM    BUN 7 08/01/2018 10:20 AM    CREATININE 0.5 (L) 08/01/2018 10:20 AM    GLUCOSE 121 (H) 08/01/2018 10:20 AM     (L) 08/01/2018 10:20 AM    K 3.5 08/01/2018 10:20 AM    CO2 19 (L) 08/01/2018 10:20 AM     08/01/2018 10:20 AM     Anesthesia Plan    ASA 1   NPO status reviewed: > 8 hours    Regional and spinal                          Anesthetic plan and risks discussed with patient.      Plan discussed with CRNA.              "

## 2018-09-11 NOTE — PLAN OF CARE
Problem: Pain - Adult  Goal: Verbalizes/displays adequate comfort level or baseline comfort level  INTERVENTIONS:  1. Encourage patient to monitor pain and request interventions  2. Assess pain using the appropriate pain scale  3. Administer analgesics based on type and severity of pain and evaluate response  4. Educate/Implement non-pharmacological measures as appropriate and evaluate response  5. Consider cultural, developmental and social influences on pain and pain management  6. Notify Provider if interventions unsuccessful or patient reports new pain   Outcome: Progressing   09/11/18 1609   Interventions Appropriate for this Patient   Verbalizes/displays adequate comfort level or baseline comfort level Encourage patient to monitor pain and request interventions;Assess pain using the appropriate pain scale;Administer analgesics based on type and severity of pain and evaluate response;Educate/Implement non-pharmacological measures as appropriate and evaluate response;Notify Provider if interventions unsuccessful or patient reports new pain;Consider cultural, developmental and social influences on pain and pain management       Problem: Infection - Adult  Goal: Absence of infection during hospitalization  INTERVENTIONS:  1. Assess and monitor for signs and symptoms of infection  2. Monitor lab/diagnostic results  3. Monitor all insertion sites/wounds/incisions  4. Monitor secretions for changes in amount and color  5. Administer medications as ordered  6. Educate and encourage patient and family to use good hand hygiene technique  7. Identify and educate in appropriate isolation precautions for identified infection/condition   Outcome: Progressing   09/11/18 1609   Interventions Appropriate for this Patient   Absence of infection during hospitalization Assess and monitor for signs and symptoms of infection;Monitor secretions for changes in amount and colo;Monitor lab/diagnostic results;Monitor all insertion  sites/wounds/incisions;Educate and encourage patient and family to use good hand hygiene technique       Problem: Safety Adult - Fall  Goal: Free from fall injury  INTERVENTIONS:    Please reference Cares/Safety Flowsheet under Dang Fall Risk for interventions.   Outcome: Progressing      Problem: Discharge Planning  Goal: Discharge to home or other facility with appropriate resources  INTERVENTIONS:  1. Identify and discuss barriers to discharge with patient and caregiver.  2. Arrange for needed discharge resources and transportation as appropriate.  3. Identify discharge learning needs (meds, wound care, etc).  4. Arrange for interpreters to assist at discharge as needed.  5. Refer to  for coordinating discharge planning if the patient needs post-hospital services based on physician order or complex needs related to functional status, cognitive ability or social support system.   Outcome: Progressing   18 1609   Interventions Appropriate for this Patient   Discharge to home or other facility with appropriate resources Identify and discuss barriers to discharge with patient and caregiver;Identify discharge learning needs (meds, wound care, etc)       Problem: Vaginal Birth or  Section  Goal: Fetal and maternal status remain reassuring during the birth process  INTERVENTIONS:  1. Monitor vital signs  2. Monitor fetal heart rate  3. Monitor uterine activity  4. Monitor labor progression (vaginal delivery)  5. DVT prophylaxis (C/S delivery)  6. Surgical antibiotic prophylaxis (C/S delivery)   Outcome: Completed Date Met: 18 1609   Interventions Appropriate for this Patient   Fetal and maternal status remain reassuring during the birth process Monitor vital signs;Monitor fetal heart rate;Monitor uterine activity;Deep vein thrombosis prophylaxis ( delivery);Surgical antibiotic prophylaxis ( delivery)       Problem: Knowledge Deficit  Goal:  Patient/family/caregiver demonstrates understanding of disease process, treatment plan, medications, and discharge instructions  INTERVENTIONS:   1. Complete learning assessment and assess knowledge base  2. Provide teaching at level of understanding   3. Provide teaching via preferred learning methods   Outcome: Progressing   09/11/18 1609   Interventions Appropriate for this Patient   Patient/family/caregiver demonstrates understanding of disease process, treatment plan, medications, and discharge instructions Complete learning assessment and assess knowledge base;Provide teaching at level of understanding;Provide teaching via preferred learning methods       Problem: Potential for Compromised Skin Integrity  Goal: Skin Integrity is Maintained or Improved  INTERVENTIONS:  1. Assess and monitor skin integrity  2. Collaborate with interdisciplinary team and initiate plans and interventions as needed  3. Alternate a full bath with partial baths for elderly   4. Monitor patient's hygiene practices   5. Collaborate with wound, ostomy, and continence nurse   Outcome: Progressing   09/11/18 1609   Interventions Appropriate for this Patient   Skin integrity is maintained or improved Assess and monitor skin integrity     Goal: Nutritional status is improving  INTERVENTIONS:  1. Monitor and assess patient for malnutrition (ex- brittle hair, bruises, dry skin, pale skin and conjunctiva, muscle wasting, smooth red tongue, and disorientation)  2. Monitor patient's weight and dietary intake as ordered or per policy  3. Determine patient's food preferences and provide high-protein, high-caloric foods as appropriate  4. Assist patient with eating   5. Allow adequate time for meals   6. Encourage patient to take dietary supplement as ordered   7. Collaborate with dietitian  8. Include patient/family/caregiver in decisions related to nutrition   Outcome: Progressing   09/11/18 1609   Interventions Appropriate for this Patient    Nutritional status is improving Monitor and assess patient for malnutrition (ex- brittle hair, bruises, dry skin, pale skin and conjunctiva, muscle wasting, smooth red tongue, and disorientation)     Goal: MOBILITY IS MAINTAINED OR IMPROVED  INTERVENTIONS  1. Collaborate with interdisciplinary team and initiate plan and interventions as ordered (PT/OT)  2. Encourage ambulation  3. Up to chair for meals  4. Monitor for signs of deconditioning   Outcome: Progressing   18   Interventions Appropriate for this Patient   Mobility is Maintained or Improved Encourage ambulation       Problem: Urinary Incontinence  Goal: Perineal skin integrity is maintained or improved  INTERVENTIONS:  1. Assess genitourinary system, perineal skin, labs (urinalysis), and history of incontinence to include past management, aggravating, and alleviating factors  2. Collaborate with interdisciplinary team including wound, ostomy, and continence nurse and initiate plans and interventions as needed  4. Consider urine containment device  5. Apply skin protectant   6. Develop skin care regimen  7. Provide privacy when changing patient's incontinence device to maintain their dignity   Outcome: Progressing   18   Interventions Appropriate for this Patient   Perineal skin integrity is maintained or improved Assess genitourinary system, perineal skin, labs (urinalysis), and history of incontinence to include past management, aggravating, and alleviating factors       Problem: Postpartum  Goal: Experiences normal postpartum course  INTERVENTIONS:  1. Monitor maternal vital signs  2. Assess uterine involution  Outcome: Progressing   18   Interventions Appropriate for this Patient   Experiences normal postpartum course Monitor maternal vital signs;Assess uterine involution     Goal: Appropriate maternal -  bonding  INTERVENTIONS:  1. Identify family support  2. Referral to  or  as  needed  Outcome: Progressing   18   Interventions Appropriate for this Patient   Appropriate maternal- bonding Identify family support     Goal: Establishment of infant feeding pattern  INTERVENTIONS:  1. Assess breast/bottle feeding  2. Refer to lactation as needed  Outcome: Progressing   18   Interventions Appropriate for this Patient   Establishment of infant feeding pattern Assess breast/bottle feeding;Refer to lactation as needed     Goal: Incisions, wounds, or drain sites healing without S/S of infection  INTERVENTIONS  1. Assess and document risk factors for skin breakdown  2. Assess and document skin integrity  3. Assess and document dressing/incision, wound bed, drain sites and surrounding tissue  4. Implement wound care per orders  Outcome: Progressing   18   Interventions Appropriate for this Patient   Incision(s), wound(s) or drain site(s) healing without S/S of infection Assess and document risk factors for skin breakdown;Assess and document skin integrity;Assess and document dressing/incision, wound bed, drain sites and surrounding tissue

## 2018-09-11 NOTE — ANESTHESIA PROCEDURE NOTES
Spinal Block    Patient location during procedure: OB  Start time: 9/11/2018 12:03 PM  End time: 9/11/2018 12:13 PM  Reason for block: primary anesthetic    Staffing  Anesthesiologist: MARTHA HARPER  CRNA: HAYLIE ADAME  Performed: anesthesiologist   Preanesthetic Checklist  Completed: patient identified, pre-op evaluation, timeout performed, IV checked, risks and benefits discussed and monitors and equipment checked  Spinal Block  Patient position: sitting  Prep: Betadine and site prepped and draped  Patient monitoring: EKG, blood pressure monitoring and continuous pulse oximetry  Approach: midline  Location: L3-4  Injection technique: single-shot  Procedure: negative aspiration for blood,  no paresthesia and positive aspiration for clear CSF  Local infiltration: lidocaine 1%  Needle  Needle type: Hernando   Needle gauge: 25 G  Needle length: 3.5 in  Needle introducer used: Yes  Epinephrine wash performed: No  Assessment  Sensory level: T6  Events: well tolerated  Additional Notes  1st attempted to L3-4 by crna with bone contacted.  Then successfully placed by Dr. Harper at same level

## 2018-09-11 NOTE — OP NOTE
OB  Delivery Note    Date: 2018    Name: Marycruz Locke    Review the Delivery Report for details.     Labor Complications:       Delivery Type: , Low Transverse        1 Minute 5 Minute   Apgar Totals: 8    9         Details    Pre-Op Diagnosis: 1. Intrauterine pregnancy at 39w6d  2. H/o c/s2   Post-Op Diagnosis: 1. Delivery of a male infant   Indications:       Procedure:        Surgeon:  Assistant: 1. Repeat  , Low Transverse  via Low Transverse  incision.   2.     CRESCENCIO Miranda MD  Told BILL Brock  MS4   Anesthesia:   spinal   Specimens: No specimens collected during this procedure.   EBL:      Findings:   Male infant delivered, weighing 2810 g (6 lb 3.1 oz) .   Complications: None     Informed Consent:  The risks, benefits, complications, and alternatives were discussed with the patient. The patient understood that the risks of  section include, but are not limited to: injury to nearby structures or organs, infection, blood loss and possible need for transfusion, and potential need for more surgery including hysterectomy. The patient stated understanding and desired to proceed. All questions were answered. The site of surgery was properly noted and marked. A Time Out was held and the above information confirmed.    Procedure Details:  The patient was taken to the operating room and prepped and draped for an abdominal procedure. A Pfannenstiel incision was created in the usual fashion. The abdomen was entered. The lower uterine segment was identified. A bladder flap was created and the bladder was advanced out of the operative field. A hysterotomy incision was made in the lower uterine segment and extended in a curvilinear fashion.  The above mentioned infant with the above mentioned apgars, weight, and sex was delivered.  Infant's nose and mouth were suctioned with bulb suction and the cord was clamped and cut.  Cord blood obtained and placenta was delivered  manually and a 3 vessel cord noted.   The hysterotomy incision was reapproximated with a running interlocking stitch of 0-vicryl suture and then a second running imbricating stitch of 0- vicryl was used.   The tubes and ovaries were noted to be within normal limits. The paracolic gutters were cleaned of clots and debris. The anterior abdominal wall peritoneum was re approximmated with a running stitch of 2-0 vicryl suture. The anterior rectus fascia was then re approximated with a running stitch of 0 PDS suture.  The skin was closed with suture.  The incision was appropriately dressed. With all counts correct, the patient was taken to recovery in stable condition. Dr. Fu provided essential assistance during the case, including some or all of the following critical tasks:  Retraction of vital structures, safe manipulation of anatomic structures, suctioning of body fluids, and wound closure.___EBL 600cc____________________________      EMMANUELLE RODRIGUEZ MD  09/11/18 3:38 PM

## 2018-09-11 NOTE — ANESTHESIA POSTPROCEDURE EVALUATION
Patient: Marycruz Locke    Procedure Summary     Date:  18 Room / Location:  [facility] LD OR 1 / [facility] L&D OR    Anesthesia Start:  1159 Anesthesia Stop:  1327    Procedure:   SECTION (N/A Uterus) Diagnosis:       Encounter for maternal care for low transverse scar from previous  delivery      (Encounter for maternal care for low transverse scar from previous  delivery [O34.211])    Surgeon:  Naeem Miranda MD Responsible Provider:  Derrick Aguirre MD    Anesthesia Type:  regional, spinal ASA Status:  1          Anesthesia Type: regional, spinal  Last vitals  BP      Temp      Pulse     Resp      SpO2      Pain Score        Anesthesia Post Evaluation    Patient location during evaluation: PACU  Patient participation: complete - patient participated  Level of consciousness: awake and alert  Pain management: adequate  Airway patency: patent  Anesthetic complications: no  Cardiovascular status: acceptable  Respiratory status: acceptable  Hydration status: acceptable  May dismiss recovered patient based on consultation with the appropriate physicians and/or meeting appropriate discharge criteria

## 2018-09-11 NOTE — NURSING END OF SHIFT
Nursing End of Shift Summary  sbar report given to Christophe RN to assume care.  Goals:   delivery    Narrative Summary of Progress Towards Clinical Goals:  Prepping for c-sec    Barriers for Transfer or Discharge: none at this time

## 2018-09-12 LAB
ERYTHROCYTE [DISTWIDTH] IN BLOOD BY AUTOMATED COUNT: 13.8 % (ref 11.5–14)
HCT VFR BLD AUTO: 30 % (ref 34–45)
HGB BLD-MCNC: 10.6 G/DL (ref 11.5–15.5)
MCH RBC QN AUTO: 30.3 PG (ref 28–33)
MCHC RBC AUTO-ENTMCNC: 35.3 G/DL (ref 32–36)
MCV RBC AUTO: 85.7 FL (ref 81–97)
PLATELET # BLD AUTO: 141 10*3/UL (ref 140–350)
PMV BLD AUTO: 11.1 FL (ref 6.9–10.8)
RBC # BLD AUTO: 3.5 10*6/ΜL (ref 3.7–5.3)
WBC # BLD AUTO: 13.6 10*3/UL (ref 4.5–10.5)

## 2018-09-12 PROCEDURE — 6370000100 HC RX 637 (ALT 250 FOR IP): Performed by: OBSTETRICS & GYNECOLOGY

## 2018-09-12 PROCEDURE — (BLANK) HC ROOM PRIVATE OBSTETRICS

## 2018-09-12 PROCEDURE — 6360000200 HC RX 636 W HCPCS (ALT 250 FOR IP): Performed by: ANESTHESIOLOGY

## 2018-09-12 PROCEDURE — 6370000100 HC RX 637 (ALT 250 FOR IP): Performed by: ANESTHESIOLOGY

## 2018-09-12 PROCEDURE — 85027 COMPLETE CBC AUTOMATED: CPT | Performed by: OBSTETRICS & GYNECOLOGY

## 2018-09-12 PROCEDURE — 2590000100 HC RX 259: Performed by: OBSTETRICS & GYNECOLOGY

## 2018-09-12 PROCEDURE — 36415 COLL VENOUS BLD VENIPUNCTURE: CPT | Performed by: OBSTETRICS & GYNECOLOGY

## 2018-09-12 RX ADMIN — IBUPROFEN 800 MG: 800 TABLET ORAL at 22:34

## 2018-09-12 RX ADMIN — DOCUSATE SODIUM 100 MG: 100 CAPSULE, LIQUID FILLED ORAL at 20:52

## 2018-09-12 RX ADMIN — KETOROLAC TROMETHAMINE 30 MG: 30 INJECTION, SOLUTION INTRAMUSCULAR; INTRAVENOUS at 03:23

## 2018-09-12 RX ADMIN — SIMETHICONE CHEW TAB 80 MG 80 MG: 80 TABLET ORAL at 21:21

## 2018-09-12 RX ADMIN — SIMETHICONE CHEW TAB 80 MG 80 MG: 80 TABLET ORAL at 08:18

## 2018-09-12 RX ADMIN — SIMETHICONE CHEW TAB 80 MG 80 MG: 80 TABLET ORAL at 12:25

## 2018-09-12 RX ADMIN — IBUPROFEN 800 MG: 800 TABLET ORAL at 14:39

## 2018-09-12 RX ADMIN — DOCUSATE SODIUM 100 MG: 100 CAPSULE, LIQUID FILLED ORAL at 08:18

## 2018-09-12 RX ADMIN — SIMETHICONE CHEW TAB 80 MG 80 MG: 80 TABLET ORAL at 17:08

## 2018-09-12 RX ADMIN — OXYCODONE HYDROCHLORIDE AND ACETAMINOPHEN 1 TABLET: 5; 325 TABLET ORAL at 04:42

## 2018-09-12 RX ADMIN — OXYCODONE HYDROCHLORIDE AND ACETAMINOPHEN 1 TABLET: 5; 325 TABLET ORAL at 17:15

## 2018-09-12 RX ADMIN — SIMETHICONE CHEW TAB 80 MG 80 MG: 80 TABLET ORAL at 03:22

## 2018-09-12 RX ADMIN — OXYCODONE HYDROCHLORIDE AND ACETAMINOPHEN 1 TABLET: 5; 325 TABLET ORAL at 12:25

## 2018-09-12 RX ADMIN — OXYCODONE HYDROCHLORIDE AND ACETAMINOPHEN 2 TABLET: 5; 325 TABLET ORAL at 21:21

## 2018-09-12 RX ADMIN — OXYCODONE HYDROCHLORIDE AND ACETAMINOPHEN 1 TABLET: 5; 325 TABLET ORAL at 08:52

## 2018-09-12 NOTE — PROGRESS NOTES
Obstetrics Postpartum Progress Note    Date of Service: 18    Subjective   No complaints.   Bleeding:MAL  Ambulating  well      Objective   Vitals:    18 0600   BP: 100/52   Pulse: 65   Resp: 18   Temp: 36.6 °C (97.9 °F)   SpO2: 98%       Physical Exam  General: alert and oriented  Uterus:  Firm, non tender, 2 fingerbreaths below umbilicus  Extremities: symmetric  Incision: dressing clean, dry, intact    Labs  Recent Results (from the past 24 hour(s))   CBC Blood, Venous    Collection Time: 18 11:03 AM   Result Value Ref Range    WBC 9.6 4.5 - 10.5 10*3/uL    RBC 4.16 3.70 - 5.30 10*6/µL    Hemoglobin 12.7 11.5 - 15.5 g/dL    Hematocrit 35.4 34.0 - 45.0 %    MCV 85.2 81.0 - 97.0 fL    MCH 30.6 28.0 - 33.0 pg    MCHC 35.9 32.0 - 36.0 g/dL    RDW 14.0 11.5 - 14.0 %    Platelets 159 140 - 350 10*3/uL    MPV 10.9 (H) 6.9 - 10.8 fL   Syphilis antibody (total) with reflex, serum Blood, Venous    Collection Time: 18 11:03 AM   Result Value Ref Range    Syphilis Treponemal Ab Non-reactive Non-reactive   Type and screen    Collection Time: 18 11:03 AM   Result Value Ref Range    ABO Type O     Rh Type POS     Antibody Screen NEG    CBC Blood, Venous    Collection Time: 18  5:12 AM   Result Value Ref Range    WBC 13.6 (H) 4.5 - 10.5 10*3/uL    RBC 3.50 (L) 3.70 - 5.30 10*6/µL    Hemoglobin 10.6 (L) 11.5 - 15.5 g/dL    Hematocrit 30.0 (L) 34.0 - 45.0 %    MCV 85.7 81.0 - 97.0 fL    MCH 30.3 28.0 - 33.0 pg    MCHC 35.3 32.0 - 36.0 g/dL    RDW 13.8 11.5 - 14.0 %    Platelets 141 140 - 350 10*3/uL    MPV 11.1 (H) 6.9 - 10.8 fL       Assessment/Plan     Marycruz A L[place] is a 33 y.o.  postpartum day 1 s/p , Low Transverse  delivery.     1. Routine postpartum care  2. Disposition: likely home postpartum day 2  ______________________________  EMMANUELLE RODRIGUEZ MD  18 6:43 AM

## 2018-09-12 NOTE — PLAN OF CARE
Problem: Pain - Adult  Goal: Verbalizes/displays adequate comfort level or baseline comfort level  INTERVENTIONS:  1. Encourage patient to monitor pain and request interventions  2. Assess pain using the appropriate pain scale  3. Administer analgesics based on type and severity of pain and evaluate response  4. Educate/Implement non-pharmacological measures as appropriate and evaluate response  5. Consider cultural, developmental and social influences on pain and pain management  6. Notify Provider if interventions unsuccessful or patient reports new pain   Outcome: Progressing   09/12/18 1016   Interventions Appropriate for this Patient   Verbalizes/displays adequate comfort level or baseline comfort level Encourage patient to monitor pain and request interventions;Assess pain using the appropriate pain scale;Administer analgesics based on type and severity of pain and evaluate response;Educate/Implement non-pharmacological measures as appropriate and evaluate response       Problem: Infection - Adult  Goal: Absence of infection during hospitalization  INTERVENTIONS:  1. Assess and monitor for signs and symptoms of infection  2. Monitor lab/diagnostic results  3. Monitor all insertion sites/wounds/incisions  4. Monitor secretions for changes in amount and color  5. Administer medications as ordered  6. Educate and encourage patient and family to use good hand hygiene technique  7. Identify and educate in appropriate isolation precautions for identified infection/condition   Outcome: Progressing   09/12/18 1016   Interventions Appropriate for this Patient   Absence of infection during hospitalization Assess and monitor for signs and symptoms of infection;Monitor lab/diagnostic results;Monitor all insertion sites/wounds/incisions;Monitor secretions for changes in amount and colo;Educate and encourage patient and family to use good hand hygiene technique       Problem: Safety Adult - Fall  Goal: Free from fall  injury  INTERVENTIONS:    Please reference Cares/Safety Flowsheet under Dang Fall Risk for interventions.   Outcome: Progressing      Problem: Discharge Planning  Goal: Discharge to home or other facility with appropriate resources  INTERVENTIONS:  1. Identify and discuss barriers to discharge with patient and caregiver.  2. Arrange for needed discharge resources and transportation as appropriate.  3. Identify discharge learning needs (meds, wound care, etc).  4. Arrange for interpreters to assist at discharge as needed.  5. Refer to  for coordinating discharge planning if the patient needs post-hospital services based on physician order or complex needs related to functional status, cognitive ability or social support system.   Outcome: Progressing   09/12/18 1016   Interventions Appropriate for this Patient   Discharge to home or other facility with appropriate resources Identify and discuss barriers to discharge with patient and caregiver;Arrange for needed discharge resources and transportation as appropriate;Identify discharge learning needs (meds, wound care, etc)       Problem: Knowledge Deficit  Goal: Patient/family/caregiver demonstrates understanding of disease process, treatment plan, medications, and discharge instructions  INTERVENTIONS:   1. Complete learning assessment and assess knowledge base  2. Provide teaching at level of understanding   3. Provide teaching via preferred learning methods   Outcome: Progressing   09/12/18 1016   Interventions Appropriate for this Patient   Patient/family/caregiver demonstrates understanding of disease process, treatment plan, medications, and discharge instructions Complete learning assessment and assess knowledge base;Provide teaching at level of understanding;Provide teaching via preferred learning methods       Problem: Potential for Compromised Skin Integrity  Goal: Skin Integrity is Maintained or Improved  INTERVENTIONS:  1. Assess and monitor  skin integrity  2. Collaborate with interdisciplinary team and initiate plans and interventions as needed  3. Alternate a full bath with partial baths for elderly   4. Monitor patient's hygiene practices   5. Collaborate with wound, ostomy, and continence nurse   Outcome: Progressing   18 1016   Interventions Appropriate for this Patient   Skin integrity is maintained or improved Assess and monitor skin integrity     Goal: Nutritional status is improving  INTERVENTIONS:  1. Monitor and assess patient for malnutrition (ex- brittle hair, bruises, dry skin, pale skin and conjunctiva, muscle wasting, smooth red tongue, and disorientation)  2. Monitor patient's weight and dietary intake as ordered or per policy  3. Determine patient's food preferences and provide high-protein, high-caloric foods as appropriate  4. Assist patient with eating   5. Allow adequate time for meals   6. Encourage patient to take dietary supplement as ordered   7. Collaborate with dietitian  8. Include patient/family/caregiver in decisions related to nutrition   Outcome: Progressing  Advance diet per orders w/o nausea   Goal: MOBILITY IS MAINTAINED OR IMPROVED  INTERVENTIONS  1. Collaborate with interdisciplinary team and initiate plan and interventions as ordered (PT/OT)  2. Encourage ambulation  3. Up to chair for meals  4. Monitor for signs of deconditioning   Outcome: Progressing   18 1016   Interventions Appropriate for this Patient   Mobility is Maintained or Improved Encourage ambulation       Problem: Postpartum  Goal: Experiences normal postpartum course  INTERVENTIONS:  1. Monitor maternal vital signs  2. Assess uterine involution   Outcome: Progressing   18 1016   Interventions Appropriate for this Patient   Experiences normal postpartum course Monitor maternal vital signs;Assess uterine involution     Goal: Appropriate maternal -  bonding  INTERVENTIONS:  1. Identify family support  2. Referral to social  worker or  as needed   Outcome: Progressing   18 1016   Interventions Appropriate for this Patient   Appropriate maternal- bonding Identify family support     Goal: Establishment of infant feeding pattern  INTERVENTIONS:  1. Assess breast/bottle feeding  2. Refer to lactation as needed   Outcome: Progressing   18 1016   Interventions Appropriate for this Patient   Establishment of infant feeding pattern Assess breast/bottle feeding;Refer to lactation as needed     Goal: Incisions, wounds, or drain sites healing without S/S of infection  INTERVENTIONS  1. Assess and document risk factors for skin breakdown  2. Assess and document skin integrity  3. Assess and document dressing/incision, wound bed, drain sites and surrounding tissue  4. Implement wound care per orders   Outcome: Progressing   18 1016   Interventions Appropriate for this Patient   Incision(s), wound(s) or drain site(s) healing without S/S of infection Assess and document risk factors for skin breakdown;Assess and document skin integrity;Assess and document dressing/incision, wound bed, drain sites and surrounding tissue

## 2018-09-12 NOTE — PLAN OF CARE
Problem: Pain - Adult  Goal: Verbalizes/displays adequate comfort level or baseline comfort level  INTERVENTIONS:  1. Encourage patient to monitor pain and request interventions  2. Assess pain using the appropriate pain scale  3. Administer analgesics based on type and severity of pain and evaluate response  4. Educate/Implement non-pharmacological measures as appropriate and evaluate response  5. Consider cultural, developmental and social influences on pain and pain management  6. Notify Provider if interventions unsuccessful or patient reports new pain   Outcome: Progressing   09/11/18 2140   Interventions Appropriate for this Patient   Verbalizes/displays adequate comfort level or baseline comfort level Encourage patient to monitor pain and request interventions;Assess pain using the appropriate pain scale;Administer analgesics based on type and severity of pain and evaluate response;Educate/Implement non-pharmacological measures as appropriate and evaluate response;Consider cultural, developmental and social influences on pain and pain management       Problem: Infection - Adult  Goal: Absence of infection during hospitalization  INTERVENTIONS:  1. Assess and monitor for signs and symptoms of infection  2. Monitor lab/diagnostic results  3. Monitor all insertion sites/wounds/incisions  4. Monitor secretions for changes in amount and color  5. Administer medications as ordered  6. Educate and encourage patient and family to use good hand hygiene technique  7. Identify and educate in appropriate isolation precautions for identified infection/condition   Outcome: Progressing   09/11/18 2140   Interventions Appropriate for this Patient   Absence of infection during hospitalization Assess and monitor for signs and symptoms of infection;Monitor lab/diagnostic results;Monitor all insertion sites/wounds/incisions;Monitor secretions for changes in amount and colo;Administer medications as ordered;Educate and encourage  patient and family to use good hand hygiene technique       Problem: Safety Adult - Fall  Goal: Free from fall injury  INTERVENTIONS:    Please reference Cares/Safety Flowsheet under Dang Fall Risk for interventions.   Outcome: Progressing      Problem: Discharge Planning  Goal: Discharge to home or other facility with appropriate resources  INTERVENTIONS:  1. Identify and discuss barriers to discharge with patient and caregiver.  2. Arrange for needed discharge resources and transportation as appropriate.  3. Identify discharge learning needs (meds, wound care, etc).  4. Arrange for interpreters to assist at discharge as needed.  5. Refer to  for coordinating discharge planning if the patient needs post-hospital services based on physician order or complex needs related to functional status, cognitive ability or social support system.   Outcome: Progressing   09/11/18 2140   Interventions Appropriate for this Patient   Discharge to home or other facility with appropriate resources Identify and discuss barriers to discharge with patient and caregiver       Problem: Knowledge Deficit  Goal: Patient/family/caregiver demonstrates understanding of disease process, treatment plan, medications, and discharge instructions  INTERVENTIONS:   1. Complete learning assessment and assess knowledge base  2. Provide teaching at level of understanding   3. Provide teaching via preferred learning methods   Outcome: Progressing   09/11/18 2140   Interventions Appropriate for this Patient   Patient/family/caregiver demonstrates understanding of disease process, treatment plan, medications, and discharge instructions Complete learning assessment and assess knowledge base;Provide teaching at level of understanding;Provide teaching via preferred learning methods       Problem: Potential for Compromised Skin Integrity  Goal: Skin Integrity is Maintained or Improved  INTERVENTIONS:  1. Assess and monitor skin integrity  2.  Collaborate with interdisciplinary team and initiate plans and interventions as needed  3. Alternate a full bath with partial baths for elderly   4. Monitor patient's hygiene practices   5. Collaborate with wound, ostomy, and continence nurse   Outcome: Progressing   09/11/18 2140   Interventions Appropriate for this Patient   Skin integrity is maintained or improved Assess and monitor skin integrity     Goal: Nutritional status is improving  INTERVENTIONS:  1. Monitor and assess patient for malnutrition (ex- brittle hair, bruises, dry skin, pale skin and conjunctiva, muscle wasting, smooth red tongue, and disorientation)  2. Monitor patient's weight and dietary intake as ordered or per policy  3. Determine patient's food preferences and provide high-protein, high-caloric foods as appropriate  4. Assist patient with eating   5. Allow adequate time for meals   6. Encourage patient to take dietary supplement as ordered   7. Collaborate with dietitian  8. Include patient/family/caregiver in decisions related to nutrition   Outcome: Progressing   09/11/18 2140   Interventions Appropriate for this Patient   Nutritional status is improving Include patient/family/caregiver in decisions related to nutrition     Goal: MOBILITY IS MAINTAINED OR IMPROVED  INTERVENTIONS  1. Collaborate with interdisciplinary team and initiate plan and interventions as ordered (PT/OT)  2. Encourage ambulation  3. Up to chair for meals  4. Monitor for signs of deconditioning   Outcome: Progressing   09/11/18 2140   Interventions Appropriate for this Patient   Mobility is Maintained or Improved Encourage ambulation       Problem: Postpartum  Goal: Experiences normal postpartum course  INTERVENTIONS:  1. Monitor maternal vital signs  2. Assess uterine involution   Outcome: Progressing   09/11/18 2140   Interventions Appropriate for this Patient   Experiences normal postpartum course Monitor maternal vital signs;Assess uterine involution     Goal:  Appropriate maternal -  bonding  INTERVENTIONS:  1. Identify family support  2. Referral to  or  as needed   Outcome: Progressing   18   Interventions Appropriate for this Patient   Appropriate maternal- bonding Identify family support     Goal: Establishment of infant feeding pattern  INTERVENTIONS:  1. Assess breast/bottle feeding  2. Refer to lactation as needed   Outcome: Progressing   18   Interventions Appropriate for this Patient   Establishment of infant feeding pattern Assess breast/bottle feeding     Goal: Incisions, wounds, or drain sites healing without S/S of infection  INTERVENTIONS  1. Assess and document risk factors for skin breakdown  2. Assess and document skin integrity  3. Assess and document dressing/incision, wound bed, drain sites and surrounding tissue  4. Implement wound care per orders   Outcome: 18   Interventions Appropriate for this Patient   Incision(s), wound(s) or drain site(s) healing without S/S of infection Assess and document risk factors for skin breakdown;Assess and document skin integrity

## 2018-09-13 VITALS
HEART RATE: 66 BPM | WEIGHT: 229.28 LBS | DIASTOLIC BLOOD PRESSURE: 65 MMHG | RESPIRATION RATE: 18 BRPM | BODY MASS INDEX: 39.14 KG/M2 | OXYGEN SATURATION: 98 % | TEMPERATURE: 97.5 F | SYSTOLIC BLOOD PRESSURE: 121 MMHG | HEIGHT: 64 IN

## 2018-09-13 LAB
ERYTHROCYTE [DISTWIDTH] IN BLOOD BY AUTOMATED COUNT: 14.3 % (ref 11.5–14)
HCT VFR BLD AUTO: 29.8 % (ref 34–45)
HGB BLD-MCNC: 10.5 G/DL (ref 11.5–15.5)
MCH RBC QN AUTO: 31.1 PG (ref 28–33)
MCHC RBC AUTO-ENTMCNC: 35.4 G/DL (ref 32–36)
MCV RBC AUTO: 88.1 FL (ref 81–97)
PLATELET # BLD AUTO: 136 10*3/UL (ref 140–350)
PMV BLD AUTO: 10.5 FL (ref 6.9–10.8)
RBC # BLD AUTO: 3.38 10*6/ΜL (ref 3.7–5.3)
WBC # BLD AUTO: 10.1 10*3/UL (ref 4.5–10.5)

## 2018-09-13 PROCEDURE — 6370000100 HC RX 637 (ALT 250 FOR IP): Performed by: OBSTETRICS & GYNECOLOGY

## 2018-09-13 PROCEDURE — 36415 COLL VENOUS BLD VENIPUNCTURE: CPT | Performed by: OBSTETRICS & GYNECOLOGY

## 2018-09-13 PROCEDURE — 85027 COMPLETE CBC AUTOMATED: CPT | Performed by: OBSTETRICS & GYNECOLOGY

## 2018-09-13 PROCEDURE — 2590000100 HC RX 259: Performed by: OBSTETRICS & GYNECOLOGY

## 2018-09-13 PROCEDURE — 2500000200 HC RX 250 WO HCPCS: Performed by: OBSTETRICS & GYNECOLOGY

## 2018-09-13 RX ORDER — OXYCODONE AND ACETAMINOPHEN 5; 325 MG/1; MG/1
2 TABLET ORAL ONCE AS NEEDED
Qty: 40 TABLET | Refills: 0 | Status: SHIPPED | OUTPATIENT
Start: 2018-09-13 | End: 2019-04-23 | Stop reason: ALTCHOICE

## 2018-09-13 RX ORDER — ONDANSETRON 4 MG/1
4 TABLET, ORALLY DISINTEGRATING ORAL EVERY 4 HOURS PRN
Status: DISCONTINUED | OUTPATIENT
Start: 2018-09-13 | End: 2018-09-13 | Stop reason: HOSPADM

## 2018-09-13 RX ORDER — IBUPROFEN 800 MG/1
800 TABLET ORAL EVERY 8 HOURS SCHEDULED
Qty: 40 TABLET | Refills: 1 | Status: SHIPPED | OUTPATIENT
Start: 2018-09-13 | End: 2019-07-25 | Stop reason: ALTCHOICE

## 2018-09-13 RX ADMIN — ONDANSETRON 4 MG: 4 TABLET, ORALLY DISINTEGRATING ORAL at 06:45

## 2018-09-13 RX ADMIN — SIMETHICONE CHEW TAB 80 MG 80 MG: 80 TABLET ORAL at 02:42

## 2018-09-13 RX ADMIN — OXYCODONE HYDROCHLORIDE AND ACETAMINOPHEN 1 TABLET: 5; 325 TABLET ORAL at 07:52

## 2018-09-13 RX ADMIN — DOCUSATE SODIUM 100 MG: 100 CAPSULE, LIQUID FILLED ORAL at 07:52

## 2018-09-13 RX ADMIN — OXYCODONE HYDROCHLORIDE AND ACETAMINOPHEN 2 TABLET: 5; 325 TABLET ORAL at 02:42

## 2018-09-13 RX ADMIN — SIMETHICONE CHEW TAB 80 MG 80 MG: 80 TABLET ORAL at 06:23

## 2018-09-13 RX ADMIN — SIMETHICONE CHEW TAB 80 MG 80 MG: 80 TABLET ORAL at 11:02

## 2018-09-13 RX ADMIN — IBUPROFEN 800 MG: 800 TABLET ORAL at 06:45

## 2018-09-13 NOTE — DISCHARGE INSTRUCTIONS
C/S Postpartum Discharge Instructions  Breast-Feeding  • Feed your baby on demand.  Breastfeeding every 1 ½ -3 hours is normal. You create more milk by breastfeeding more often  • Drink water when thirsty.  Eat a balanced diet.  Most things you eat and drink enter the breast milk including caffeine, alcohol, and medicines.  • The most common cause of pain with breastfeeding is because the baby is not latched on right.  See the Celebrate the Family Book, for more information.  • Call [facility] [phone] if you have any breastfeeding questions or concerns.  • Breast-milk may be stored in a refrigerator for up to 3 days and in a freezer for 3-6 months.  Do not use a microwave to thaw frozen milk.  Using warm water for thawing is the best way.  • Call your provider if you have red, tender areas on your breast along with a fever.      Bottle-Feeding  • Breast Care:  Wear a comfortable, supportive bra.  Use pain medicines (Acetaminophen) as needed to relieve swelling and pressure.  Ice packs may be put on the breasts 4 times a day for 15 minutes each time for comfort. Avoid breast stimulation such as hot showers directly on the breasts or expressing milk.  • Rest and relax when the baby sleeps, at least 2 times a day  • Do not heat formula in the microwave ---it may cause severe burns  • Feed the baby on demand.   Hold the baby in your arms when feeding the baby.  After every ½ to 1 ounce or more the baby drinks, burp the baby.  Never leave the baby with a propped bottle.  If your baby does not finish the whole bottle, throw it away the unused formula.       Section  • No heavy lifting (nothing heavier than baby) for at least 6 weeks.  Avoid side-to-side movements (vacuuming, sweeping, reaching).  • Keep the incision clean and dry.  Watch for infection (redness, discharge from incision, warmth, stitches , and swelling).  Call your provider if you see any of these signs of  infection.  • No abdominal exercised for 6 weeks.  Increase activity slowly.    Sexual Activity  • No sex for 6 weeks  • When you resume sex, use water-soluble jelly (K-Y) or contraceptive cream if you notice a need for more vaginal lubrication  • Breast-feeding is not a form of birth control.  • When you resume sex, foams and condoms are a good form of birth control and prevent sexually transmitted diseases, when used right.  • Follow your providers suggestions for birth control.          Diet  • Eat a well-balanced diet; choose from all food groups for each meal (milk or dairy, meat, fruits, vegetables, grains.)      When to call your provider  • Heavy or bright red bleeding (more than 1 pad per hour) not related to increased activity.  Change in bleeding back to dark red clots.  • Bad smell to the vaginal discharge  • If you have pain, burning, trouble, or frequency with urination  • Lower back pain  • Chills, fever above 100.4 degrees F, Feeling like you have the3 flu, or increased stomach tenderness or pain  • Pain, redness, swelling, increased warmth in calf or any part of the leg.  • Sadness or blues lasting longer than 2 weeks or thoughts of hurting yourself or the baby    You may receive a follow up phone call from Janelle Umanzor to see how your care was.  We appreciate your feedback.  Cord Care  • Keep cord clean and dry  • Keep the diaper folded below the cord  • Observe the cord for signs of infection (redness, mushy, foul smelling cord).  • Put nothing on the cord.  Bathing  • No tub bath until the cord falls off.  • When bathing baby, keep the room warm and all supplies close at hand. Never leave baby alone.  • Keep the water temperature warm to touch to prevent burns  • Test water temperature on your wrist or elbow  • Brush or wash baby’s hair every day with a stiff bristled brush to prevent cradle cap  • Wash and rinse the hair well  Circumcisions  • If instructed by the provider, apply ointment to the  circumcision after every diaper change  • The circumcision should heal around 7-10 days.  There should be no bleeding.  • The tip of the penis may appear whitish or yellowish in places as it heals.  • Call your doctor for any signs of infection, such as discharge, swelling  Bowel Movements  • Some babies have a stool with every feeding; other babies many have one stool every 36 to 48 hours.  • Usually, breast-fed babies have a liquid, yellow, or mustard colored stools  • If you are breast-feeding your baby, don’t take a runny stools as a sign of diarrhea.  • Notify your doctor if your baby’s stools are small and pebble-like or runny and frequent  JAUNDICE  • Observe for a yellow color of the skin   • Notify the doctor if you notice:  o Increasing yellow color in the skin or in the white part of the eyes  o The baby seems more tired than usual  o The baby does not want to eat  Temperature  • Take the baby’s temperature only if you think your baby is sick.  A thermometer under the arm works well  • Signs of illness may be fussiness, crying hard for no apparent reason, repedated vomiting, or diarrhea (watery stool)  • Notify the doctor if any of the above occurs of if the baby has a fever onver 100.4 degrees F.  Clothing  • Dress the baby as warmly as you would dress yourself  • Wash all new clothes and blankets with a mild detergent before using for the first time    Safety  • Use the infant car seat at all times.  Follow the ’s instructions  • Never leave the baby alone  • Place the baby on his/her back when he/she is sleeping.  • Keep the bulb syringe nearby at all times  • Do not use pillows in the crib  • Your baby should sleep alone in a crib or cradle on a firm mattress.  Do not cover the babys head while sleeping  • Do not shake your baby  • No smoking near your baby      You may receive a follow up phone call from Evri to see how your care was.  We appreciate your feedback.

## 2018-09-13 NOTE — PLAN OF CARE
Problem: Pain - Adult  Goal: Verbalizes/displays adequate comfort level or baseline comfort level  INTERVENTIONS:  1. Encourage patient to monitor pain and request interventions  2. Assess pain using the appropriate pain scale  3. Administer analgesics based on type and severity of pain and evaluate response  4. Educate/Implement non-pharmacological measures as appropriate and evaluate response  5. Consider cultural, developmental and social influences on pain and pain management  6. Notify Provider if interventions unsuccessful or patient reports new pain   Outcome: Progressing   09/13/18 0940   Interventions Appropriate for this Patient   Verbalizes/displays adequate comfort level or baseline comfort level Encourage patient to monitor pain and request interventions;Assess pain using the appropriate pain scale;Administer analgesics based on type and severity of pain and evaluate response;Educate/Implement non-pharmacological measures as appropriate and evaluate response;Consider cultural, developmental and social influences on pain and pain management;Notify Provider if interventions unsuccessful or patient reports new pain       Problem: Infection - Adult  Goal: Absence of infection during hospitalization  INTERVENTIONS:  1. Assess and monitor for signs and symptoms of infection  2. Monitor lab/diagnostic results  3. Monitor all insertion sites/wounds/incisions  4. Monitor secretions for changes in amount and color  5. Administer medications as ordered  6. Educate and encourage patient and family to use good hand hygiene technique  7. Identify and educate in appropriate isolation precautions for identified infection/condition   Outcome: Progressing   09/13/18 0940   Interventions Appropriate for this Patient   Absence of infection during hospitalization Assess and monitor for signs and symptoms of infection;Monitor lab/diagnostic results;Monitor all insertion sites/wounds/incisions       Problem: Safety Adult -  Fall  Goal: Free from fall injury  INTERVENTIONS:    Please reference Cares/Safety Flowsheet under Dang Fall Risk for interventions.   Outcome: Progressing      Problem: Discharge Planning  Goal: Discharge to home or other facility with appropriate resources  INTERVENTIONS:  1. Identify and discuss barriers to discharge with patient and caregiver.  2. Arrange for needed discharge resources and transportation as appropriate.  3. Identify discharge learning needs (meds, wound care, etc).  4. Arrange for interpreters to assist at discharge as needed.  5. Refer to  for coordinating discharge planning if the patient needs post-hospital services based on physician order or complex needs related to functional status, cognitive ability or social support system.   Outcome: Progressing   09/13/18 0940   Interventions Appropriate for this Patient   Discharge to home or other facility with appropriate resources Identify and discuss barriers to discharge with patient and caregiver;Identify discharge learning needs (meds, wound care, etc)       Problem: Knowledge Deficit  Goal: Patient/family/caregiver demonstrates understanding of disease process, treatment plan, medications, and discharge instructions  INTERVENTIONS:   1. Complete learning assessment and assess knowledge base  2. Provide teaching at level of understanding   3. Provide teaching via preferred learning methods   Outcome: Progressing   09/13/18 0940   Interventions Appropriate for this Patient   Patient/family/caregiver demonstrates understanding of disease process, treatment plan, medications, and discharge instructions Provide teaching via preferred learning methods       Problem: Potential for Compromised Skin Integrity  Goal: Skin Integrity is Maintained or Improved  INTERVENTIONS:  1. Assess and monitor skin integrity  2. Collaborate with interdisciplinary team and initiate plans and interventions as needed  3. Alternate a full bath with  partial baths for elderly   4. Monitor patient's hygiene practices   5. Collaborate with wound, ostomy, and continence nurse   Outcome: Progressing   09/13/18 0940   Interventions Appropriate for this Patient   Skin integrity is maintained or improved Assess and monitor skin integrity     Goal: Nutritional status is improving  INTERVENTIONS:  1. Monitor and assess patient for malnutrition (ex- brittle hair, bruises, dry skin, pale skin and conjunctiva, muscle wasting, smooth red tongue, and disorientation)  2. Monitor patient's weight and dietary intake as ordered or per policy  3. Determine patient's food preferences and provide high-protein, high-caloric foods as appropriate  4. Assist patient with eating   5. Allow adequate time for meals   6. Encourage patient to take dietary supplement as ordered   7. Collaborate with dietitian  8. Include patient/family/caregiver in decisions related to nutrition   Outcome: Progressing   09/13/18 0940   Interventions Appropriate for this Patient   Nutritional status is improving Monitor and assess patient for malnutrition (ex- brittle hair, bruises, dry skin, pale skin and conjunctiva, muscle wasting, smooth red tongue, and disorientation)     Goal: MOBILITY IS MAINTAINED OR IMPROVED  INTERVENTIONS  1. Collaborate with interdisciplinary team and initiate plan and interventions as ordered (PT/OT)  2. Encourage ambulation  3. Up to chair for meals  4. Monitor for signs of deconditioning   Outcome: Progressing   09/13/18 0940   Interventions Appropriate for this Patient   Mobility is Maintained or Improved Encourage ambulation       Problem: Postpartum  Goal: Experiences normal postpartum course  INTERVENTIONS:  1. Monitor maternal vital signs  2. Assess uterine involution   Outcome: Progressing   09/13/18 0940   Interventions Appropriate for this Patient   Experiences normal postpartum course Assess uterine involution;Monitor maternal vital signs     Goal: Appropriate maternal -   bonding  INTERVENTIONS:  1. Identify family support  2. Referral to  or  as needed   Outcome: Progressing   18   Interventions Appropriate for this Patient   Appropriate maternal- bonding Identify family support     Goal: Establishment of infant feeding pattern  INTERVENTIONS:  1. Assess breast/bottle feeding  2. Refer to lactation as needed   Outcome: Progressing   18   Interventions Appropriate for this Patient   Establishment of infant feeding pattern Assess breast/bottle feeding;Refer to lactation as needed     Goal: Incisions, wounds, or drain sites healing without S/S of infection  INTERVENTIONS  1. Assess and document risk factors for skin breakdown  2. Assess and document skin integrity  3. Assess and document dressing/incision, wound bed, drain sites and surrounding tissue  4. Implement wound care per orders   Outcome: Progressing   18   Interventions Appropriate for this Patient   Incision(s), wound(s) or drain site(s) healing without S/S of infection Assess and document risk factors for skin breakdown

## 2018-09-13 NOTE — PLAN OF CARE
Problem: Pain - Adult  Goal: Verbalizes/displays adequate comfort level or baseline comfort level  INTERVENTIONS:  1. Encourage patient to monitor pain and request interventions  2. Assess pain using the appropriate pain scale  3. Administer analgesics based on type and severity of pain and evaluate response  4. Educate/Implement non-pharmacological measures as appropriate and evaluate response  5. Consider cultural, developmental and social influences on pain and pain management  6. Notify Provider if interventions unsuccessful or patient reports new pain   Outcome: Progressing   09/12/18 1016   Interventions Appropriate for this Patient   Verbalizes/displays adequate comfort level or baseline comfort level Encourage patient to monitor pain and request interventions;Assess pain using the appropriate pain scale;Administer analgesics based on type and severity of pain and evaluate response;Educate/Implement non-pharmacological measures as appropriate and evaluate response       Problem: Infection - Adult  Goal: Absence of infection during hospitalization  INTERVENTIONS:  1. Assess and monitor for signs and symptoms of infection  2. Monitor lab/diagnostic results  3. Monitor all insertion sites/wounds/incisions  4. Monitor secretions for changes in amount and color  5. Administer medications as ordered  6. Educate and encourage patient and family to use good hand hygiene technique  7. Identify and educate in appropriate isolation precautions for identified infection/condition   Outcome: Progressing   09/12/18 1016   Interventions Appropriate for this Patient   Absence of infection during hospitalization Assess and monitor for signs and symptoms of infection;Monitor lab/diagnostic results;Monitor all insertion sites/wounds/incisions;Monitor secretions for changes in amount and colo;Educate and encourage patient and family to use good hand hygiene technique       Problem: Safety Adult - Fall  Goal: Free from fall  injury  INTERVENTIONS:    Please reference Cares/Safety Flowsheet under Dang Fall Risk for interventions.   Outcome: Progressing      Problem: Discharge Planning  Goal: Discharge to home or other facility with appropriate resources  INTERVENTIONS:  1. Identify and discuss barriers to discharge with patient and caregiver.  2. Arrange for needed discharge resources and transportation as appropriate.  3. Identify discharge learning needs (meds, wound care, etc).  4. Arrange for interpreters to assist at discharge as needed.  5. Refer to  for coordinating discharge planning if the patient needs post-hospital services based on physician order or complex needs related to functional status, cognitive ability or social support system.   Outcome: Progressing   09/13/18 0215   Interventions Appropriate for this Patient   Discharge to home or other facility with appropriate resources Identify and discuss barriers to discharge with patient and caregiver       Problem: Knowledge Deficit  Goal: Patient/family/caregiver demonstrates understanding of disease process, treatment plan, medications, and discharge instructions  INTERVENTIONS:   1. Complete learning assessment and assess knowledge base  2. Provide teaching at level of understanding   3. Provide teaching via preferred learning methods   Outcome: Progressing   09/12/18 1016   Interventions Appropriate for this Patient   Patient/family/caregiver demonstrates understanding of disease process, treatment plan, medications, and discharge instructions Complete learning assessment and assess knowledge base;Provide teaching at level of understanding;Provide teaching via preferred learning methods       Problem: Potential for Compromised Skin Integrity  Goal: Skin Integrity is Maintained or Improved  INTERVENTIONS:  1. Assess and monitor skin integrity  2. Collaborate with interdisciplinary team and initiate plans and interventions as needed  3. Alternate a full  bath with partial baths for elderly   4. Monitor patient's hygiene practices   5. Collaborate with wound, ostomy, and continence nurse   Outcome: Progressing   18 1016   Interventions Appropriate for this Patient   Skin integrity is maintained or improved Assess and monitor skin integrity     Goal: Nutritional status is improving  INTERVENTIONS:  1. Monitor and assess patient for malnutrition (ex- brittle hair, bruises, dry skin, pale skin and conjunctiva, muscle wasting, smooth red tongue, and disorientation)  2. Monitor patient's weight and dietary intake as ordered or per policy  3. Determine patient's food preferences and provide high-protein, high-caloric foods as appropriate  4. Assist patient with eating   5. Allow adequate time for meals   6. Encourage patient to take dietary supplement as ordered   7. Collaborate with dietitian  8. Include patient/family/caregiver in decisions related to nutrition   Outcome: Progressing   18 2140   Interventions Appropriate for this Patient   Nutritional status is improving Include patient/family/caregiver in decisions related to nutrition     Goal: MOBILITY IS MAINTAINED OR IMPROVED  INTERVENTIONS  1. Collaborate with interdisciplinary team and initiate plan and interventions as ordered (PT/OT)  2. Encourage ambulation  3. Up to chair for meals  4. Monitor for signs of deconditioning   Outcome: Progressing   18 1016   Interventions Appropriate for this Patient   Mobility is Maintained or Improved Encourage ambulation       Problem: Postpartum  Goal: Experiences normal postpartum course  INTERVENTIONS:  1. Monitor maternal vital signs  2. Assess uterine involution   Outcome: Progressing   18 1016   Interventions Appropriate for this Patient   Experiences normal postpartum course Monitor maternal vital signs;Assess uterine involution     Goal: Appropriate maternal -  bonding  INTERVENTIONS:  1. Identify family support  2. Referral to social  worker or  as needed   Outcome: Progressing   18 1016   Interventions Appropriate for this Patient   Appropriate maternal- bonding Identify family support     Goal: Establishment of infant feeding pattern  INTERVENTIONS:  1. Assess breast/bottle feeding  2. Refer to lactation as needed   Outcome: Progressing   18 1016   Interventions Appropriate for this Patient   Establishment of infant feeding pattern Assess breast/bottle feeding;Refer to lactation as needed     Goal: Incisions, wounds, or drain sites healing without S/S of infection  INTERVENTIONS  1. Assess and document risk factors for skin breakdown  2. Assess and document skin integrity  3. Assess and document dressing/incision, wound bed, drain sites and surrounding tissue  4. Implement wound care per orders   Outcome: Progressing   18 1016   Interventions Appropriate for this Patient   Incision(s), wound(s) or drain site(s) healing without S/S of infection Assess and document risk factors for skin breakdown;Assess and document skin integrity;Assess and document dressing/incision, wound bed, drain sites and surrounding tissue

## 2018-09-13 NOTE — DISCHARGE SUMMARY
Discharge Summary    BRIEF OVERVIEW  Admission Date: 2018     Discharge Date: 2018    Admission Diagnosis  IUP at term with h/o previous c/s    Discharge Diagnosis  Delivery of a male infant    Procedures  1. Repeat  , Low Transverse  via Low Transverse  incision.   2.       Hospital Course  uncomplicated    Results (Last 24 Hours)  Recent Results (from the past 24 hour(s))   CBC Blood, Venous    Collection Time: 18  4:22 AM   Result Value Ref Range    WBC 10.1 4.5 - 10.5 10*3/uL    RBC 3.38 (L) 3.70 - 5.30 10*6/µL    Hemoglobin 10.5 (L) 11.5 - 15.5 g/dL    Hematocrit 29.8 (L) 34.0 - 45.0 %    MCV 88.1 81.0 - 97.0 fL    MCH 31.1 28.0 - 33.0 pg    MCHC 35.4 32.0 - 36.0 g/dL    RDW 14.3 (H) 11.5 - 14.0 %    Platelets 136 (L) 140 - 350 10*3/uL    MPV 10.5 6.9 - 10.8 fL       Diet   Regular    Discharge Medications     Medication List      START taking these medications    ibuprofen 800 mg tablet  Commonly known as:  ADVIL,MOTRIN  Take 1 tablet (800 mg total) by mouth every 8 (eight) hours.        oxyCODONE-acetaminophen 5-325 mg per tablet  Commonly known as:  PERCOCET  Take 2 tablets by mouth once as needed for pain scale 4-7/10 for up to 1 dose.           CONTINUE taking these medications    omeprazole 20 mg capsule  Commonly known as:  PriLOSEC               Activity/Restrictions  no lifting greater than 20 pounds for 6 weeks    Follow-up   Follow up in 6 Weeks    Discharge Plan  Precautions:   · Temp greater than 100.4  · Vaginal bleeding greater than 1 pad per hour  · Incision problems  · Foul smelling vaginal odor   · Increased pain     Condition of Patient at Discharge  Good    Time spent with patient/Coordination of care: 20 minutes  -----------------------------  EMMANUELLE RODRIGUEZ MD  18 6:49 AM

## 2018-09-14 ENCOUNTER — TELEPHONE (OUTPATIENT)
Dept: OBSTETRICS AND GYNECOLOGY | Facility: HOSPITAL | Age: 33
End: 2018-09-14

## 2019-03-09 ENCOUNTER — OFFICE VISIT (OUTPATIENT)
Dept: URGENT CARE | Facility: URGENT CARE | Age: 34
End: 2019-03-09
Payer: COMMERCIAL

## 2019-03-09 VITALS
TEMPERATURE: 96.6 F | HEIGHT: 64 IN | WEIGHT: 207 LBS | HEART RATE: 85 BPM | BODY MASS INDEX: 35.34 KG/M2 | OXYGEN SATURATION: 97 % | DIASTOLIC BLOOD PRESSURE: 95 MMHG | SYSTOLIC BLOOD PRESSURE: 148 MMHG | RESPIRATION RATE: 20 BRPM

## 2019-03-09 DIAGNOSIS — B97.89 VIRAL RESPIRATORY ILLNESS: ICD-10-CM

## 2019-03-09 DIAGNOSIS — J02.9 SORE THROAT: Primary | ICD-10-CM

## 2019-03-09 DIAGNOSIS — J98.8 VIRAL RESPIRATORY ILLNESS: ICD-10-CM

## 2019-03-09 DIAGNOSIS — R05.9 COUGH: ICD-10-CM

## 2019-03-09 LAB
FLUAV AG NPH QL IA: NEGATIVE
FLUBV AG NPH QL IA: NEGATIVE
GP B STREP AG SPEC QL: NEGATIVE

## 2019-03-09 PROCEDURE — 99213 OFFICE O/P EST LOW 20 MIN: CPT | Performed by: NURSE PRACTITIONER

## 2019-03-09 PROCEDURE — 87804 INFLUENZA ASSAY W/OPTIC: CPT | Mod: QW,59 | Performed by: NURSE PRACTITIONER

## 2019-03-09 PROCEDURE — 87070 CULTURE OTHR SPECIMN AEROBIC: CPT | Performed by: NURSE PRACTITIONER

## 2019-03-09 PROCEDURE — 87880 STREP A ASSAY W/OPTIC: CPT | Performed by: NURSE PRACTITIONER

## 2019-03-09 RX ORDER — FOLIC ACID/MULTIVIT,IRON,MINER 0.4MG-18MG
1 TABLET ORAL DAILY
COMMUNITY
End: 2019-12-03 | Stop reason: ALTCHOICE

## 2019-03-09 ASSESSMENT — PAIN SCALES - GENERAL: PAINLEVEL: 7

## 2019-03-09 ASSESSMENT — ENCOUNTER SYMPTOMS
CHILLS: 1
HEADACHES: 1
COUGH: 1
SORE THROAT: 1

## 2019-03-09 NOTE — PROGRESS NOTES
Sedrick Locke is a 34 y.o. female who presents for upper respiratory infection.    HPI  The patient presents with complaints of upper respiratory-like symptoms, such as sore throat, chills, body aches, headaches, upper tooth pain, and cough for the past 4 days.  The patient is tolerating oral intake though slightly less than normal.   Has been taking ibuprofen and tylenol for the throat pain with only some relief.     The following have been reviewed and updated as appropriate in this visit:  No Known Allergies  Current Outpatient Medications   Medication Sig Dispense Refill   • MVP-ferrous fumarate-FA (PRENATAL) 28 mg iron- 800 mcg tablet Take 1 tablet by mouth daily     • ibuprofen (ADVIL,MOTRIN) 800 mg tablet Take 1 tablet (800 mg total) by mouth every 8 (eight) hours. 40 tablet 1   • magic mouthwash (maalox/prednisolone/diphenhydramine/lidocaine) Swish and spit 5 mL every 4 (four) hours as needed for mucositis 250 mL 0   • oxyCODONE-acetaminophen (PERCOCET) 5-325 mg per tablet Take 2 tablets by mouth once as needed for pain scale 4-7/10 for up to 1 dose. (Patient not taking: Reported on 3/9/2019 ) 40 tablet 0   • omeprazole (PriLOSEC) 20 mg capsule Take 20 mg by mouth daily.       No current facility-administered medications for this visit.      Past Medical History:   Diagnosis Date   • Acid reflux     takes prilosec   • Delivery by elective  section 2018   • Depression     situational-2yrs ago   • Gastrointestinal disease    • Varicella      Past Surgical History:   Procedure Laterality Date   •  SECTION     •  SECTION, LOW TRANSVERSE N/A 2018    Procedure:  SECTION;  Surgeon: Naeem Miranda MD;  Location: [facility] L&D OR;  Service: Obstetrics;  Laterality: N/A;   • TONSILLECTOMY     • TONSILLECTOMY           Review of Systems   Constitutional: Positive for chills.   HENT: Positive for sore throat.    Respiratory: Positive for cough.    Neurological:  "Positive for headaches.           Objective   /95 (BP Location: Right arm, Patient Position: Sitting, Cuff Size: Reg)   Pulse 85   Temp (!) 35.9 °C (96.6 °F) (Temporal)   Resp 20   Ht 1.626 m (5' 4\")   Wt 93.9 kg (207 lb)   SpO2 97%   BMI 35.53 kg/m²   Recent Results (from the past 4 hour(s))   Rapid Strep Screen Swab    Collection Time: 03/09/19 10:44 AM   Result Value Ref Range    Strep A Screen Negative Negative   Rapid influenza A/B antigens    Collection Time: 03/09/19 11:13 AM   Result Value Ref Range    Influenza A Ag, EIA Negative Negative    Influenza B Ag, EIA Negative Negative       Physical Exam   Constitutional: She is oriented to person, place, and time. She appears well-developed and well-nourished. She appears ill.   HENT:   Head: Normocephalic and atraumatic.   Mouth/Throat: Oropharyngeal exudate and posterior oropharyngeal erythema present.   Eyes: Conjunctivae are normal.   Cardiovascular: Normal rate, regular rhythm and normal heart sounds.   Pulmonary/Chest: Effort normal and breath sounds normal.   Neurological: She is alert and oriented to person, place, and time.   Psychiatric: She has a normal mood and affect. Her behavior is normal. Judgment and thought content normal.   Nursing note and vitals reviewed.        Assessment/Plan   Diagnoses and all orders for this visit:    Sore throat  -     Rapid Strep Screen Swab  -     Throat culture  -     magic mouthwash (maalox/prednisolone/diphenhydramine/lidocaine); Swish and spit 5 mL every 4 (four) hours as needed for mucositis    Cough  -     Rapid influenza A/B antigens    Viral respiratory illness        Discussed with patient this appear to be viral.  Rapid strep and influenza was negative.  Will call with culture results.  Use over the counter tylenol or ibuprofen for fever and discomfort. Instructed the patient to take 800 mg of ibuprofen three times a day for the next 2 days alternate with 1 g of tylenol three times a day for " the next 2 days.  May use humidifier at nighttime.  Return to urgent care should you develop a fever greater than 101, shortness of breath, chest pain with cough, inability to tolerate fluids, or worseing symptoms or concerns.   Patient agrees with plans, verbalized understanding, and denies any further questions or concerns at this time.    Jennifer G Franke, CNP

## 2019-03-09 NOTE — LETTER
[facility]  [address] [facility] [place] [address]  [phone]  Dept: [phone]  March 9, 2019     Marycruz Locke  [address] [place] [address]      Patient: Marycruz Locke   YOB: 1985   Date of Visit: 3/9/2019       To Whom it May Concern:    Marycruz Locke was seen in my clinic on  3/9/2019 at [facility]. Please excuse Marycruz for her absence from work on this day to make the appointment.  She may return back to work 03/11/19.    If you have any questions or concerns, please don't hesitate to call.         Sincerely,         Jennifer G Franke, CNP        CC: No Recipients

## 2019-03-11 LAB — BACTERIA THROAT CULT: NORMAL

## 2019-04-23 ENCOUNTER — OFFICE VISIT (OUTPATIENT)
Dept: URGENT CARE | Facility: URGENT CARE | Age: 34
End: 2019-04-23
Payer: COMMERCIAL

## 2019-04-23 ENCOUNTER — ANCILLARY PROCEDURE (OUTPATIENT)
Dept: RADIOLOGY | Facility: URGENT CARE | Age: 34
End: 2019-04-23
Payer: COMMERCIAL

## 2019-04-23 VITALS
DIASTOLIC BLOOD PRESSURE: 93 MMHG | HEART RATE: 83 BPM | RESPIRATION RATE: 16 BRPM | WEIGHT: 212 LBS | BODY MASS INDEX: 36.19 KG/M2 | SYSTOLIC BLOOD PRESSURE: 138 MMHG | OXYGEN SATURATION: 99 % | TEMPERATURE: 97.2 F | HEIGHT: 64 IN

## 2019-04-23 DIAGNOSIS — R10.84 GENERALIZED ABDOMINAL PAIN: Primary | ICD-10-CM

## 2019-04-23 DIAGNOSIS — K59.00 CONSTIPATION, UNSPECIFIED CONSTIPATION TYPE: ICD-10-CM

## 2019-04-23 LAB
ALBUMIN SERPL-MCNC: 3.7 G/DL (ref 3.4–5)
ALP SERPL-CCNC: 78 U/L (ref 37–98)
ALT SERPL-CCNC: 25 U/L (ref 0–77)
ANION GAP SERPL CALC-SCNC: 4 MMOL/L (ref 3–11)
AST SERPL-CCNC: 19 U/L (ref 0–37)
BASOPHILS # BLD AUTO: 0.05 10*3/UL
BASOPHILS NFR BLD AUTO: 1 % (ref 0–2)
BILIRUB SERPL-MCNC: 0.7 MG/DL (ref 0–1.4)
BILIRUB UR QL: NEGATIVE
BUN SERPL-MCNC: 12 MG/DL (ref 7–25)
CALCIUM ALBUM COR SERPL-MCNC: 9.7 MG/DL (ref 8.6–10.1)
CALCIUM SERPL-MCNC: 9.5 MG/DL (ref 8.6–10.1)
CHLORIDE SERPL-SCNC: 110 MMOL/L (ref 98–107)
CLARITY UR: CLEAR
CO2 SERPL-SCNC: 25 MMOL/L (ref 21–32)
COLOR UR: YELLOW
CREAT SERPL-MCNC: 0.7 MG/DL (ref 0.6–1.1)
EOSINOPHIL # BLD AUTO: 0.19 10*3/UL
EOSINOPHIL NFR BLD AUTO: 3 % (ref 0–5)
ERYTHROCYTE [DISTWIDTH] IN BLOOD BY AUTOMATED COUNT: 12.7 % (ref 11.6–14.8)
GFR SERPL CREATININE-BSD FRML MDRD: 115 ML/MIN/1.73M*2
GLUCOSE SERPL-MCNC: 84 MG/DL (ref 70–105)
GLUCOSE UR QL: NEGATIVE MG/DL
HCG UR QL: NEGATIVE
HCT VFR BLD AUTO: 40.2 % (ref 37.7–47.9)
HGB BLD-MCNC: 13.6 G/DL (ref 12.1–16.2)
HGB UR QL: NEGATIVE
KETONES UR-MCNC: NEGATIVE MG/DL
LEUKOCYTE ESTERASE UR QL STRIP: NEGATIVE
LYMPHOCYTES # BLD AUTO: 1.82 10*3/UL
LYMPHOCYTES NFR BLD AUTO: 27 % (ref 13–40)
MCH RBC QN AUTO: 29 PG (ref 26–34)
MCHC RBC AUTO-ENTMCNC: 33.8 G/DL (ref 31–36)
MCV RBC AUTO: 85.8 FL (ref 80–100)
MONOCYTES # BLD AUTO: 0.62 10*3/UL
MONOCYTES NFR BLD AUTO: 9 % (ref 2–11)
NEUTROPHILS # BLD AUTO: 4.01 10*3/UL
NEUTROPHILS NFR BLD AUTO: 60 % (ref 47–80)
NITRITE UR QL: NEGATIVE
PH UR: 6 PH
PLATELET # BLD AUTO: 192 10*3/UL (ref 140–440)
PMV BLD AUTO: 9.3 FL (ref 6.9–10.8)
POTASSIUM SERPL-SCNC: 4.2 MMOL/L (ref 3.6–5)
PROT SERPL-MCNC: 6.9 G/DL (ref 6.4–8.2)
PROT UR STRIP-MCNC: NEGATIVE MG/DL
RBC # BLD AUTO: 4.68 10*6/ΜL (ref 4.05–5.48)
SODIUM SERPL-SCNC: 139 MMOL/L (ref 135–145)
SP GR UR: 1.01 (ref 1–1.03)
TSH SERPL DL<=0.05 MIU/L-ACNC: 2.04 UIU/ML (ref 0.34–4.82)
UROBILINOGEN UR-MCNC: 0.2 E.U./DL
WBC # BLD AUTO: 6.7 10*3/UL (ref 4.1–10.9)

## 2019-04-23 PROCEDURE — 99213 OFFICE O/P EST LOW 20 MIN: CPT | Performed by: FAMILY MEDICINE

## 2019-04-23 PROCEDURE — 36415 COLL VENOUS BLD VENIPUNCTURE: CPT | Performed by: FAMILY MEDICINE

## 2019-04-23 PROCEDURE — 81003 URINALYSIS AUTO W/O SCOPE: CPT | Performed by: FAMILY MEDICINE

## 2019-04-23 PROCEDURE — 80050 GENERAL HEALTH PANEL: CPT | Performed by: FAMILY MEDICINE

## 2019-04-23 PROCEDURE — 81025 URINE PREGNANCY TEST: CPT | Performed by: FAMILY MEDICINE

## 2019-04-23 PROCEDURE — 74019 RADEX ABDOMEN 2 VIEWS: CPT | Mod: TC | Performed by: FAMILY MEDICINE

## 2019-04-23 ASSESSMENT — ENCOUNTER SYMPTOMS
VOMITING: 0
UNEXPECTED WEIGHT CHANGE: 0
HEMATURIA: 0
FLANK PAIN: 0
CARDIOVASCULAR NEGATIVE: 1
DIZZINESS: 0
CHILLS: 0
FATIGUE: 1
FEVER: 0
FREQUENCY: 0
CONSTIPATION: 1
ABDOMINAL PAIN: 1
DYSURIA: 0
NAUSEA: 1
DIFFICULTY URINATING: 0
RESPIRATORY NEGATIVE: 1
EYES NEGATIVE: 1
LIGHT-HEADEDNESS: 1

## 2019-04-23 ASSESSMENT — PAIN SCALES - GENERAL: PAINLEVEL: 5

## 2019-04-23 NOTE — PROGRESS NOTES
"Sedrick Locke is a 34 y.o. female who presents with her  for concerns of abdominal pain as well as several constitutional symptoms including fatigue, weakness, constipation, dizziness or \"jittery mass\", \"massive hair loss\".  Patient states that her constitutional symptoms have been present since she had her son who is now 7 months old.  She states that her abdominal pain started yesterday and is intermittent in the epigastric area as well as the left side of abdomen.  Patient is constipated.  Her last bowel movement was today.  She states that it was very hard and difficult.  Patient is not having her menstrual cycles as she is on Depo-Provera.  Patient states that she does have cold intolerance she does admit to dry skin she is fatigued and \"tired all the time\".  Patient does not have a primary care provider at this time..      The following have been reviewed and updated as appropriate in this visit:  Past Medical, Surgical and Social History    No Known Allergies  Current Outpatient Medications   Medication Sig Dispense Refill   • MVP-ferrous fumarate-FA (PRENATAL) 28 mg iron- 800 mcg tablet Take 1 tablet by mouth daily     • ibuprofen (ADVIL,MOTRIN) 800 mg tablet Take 1 tablet (800 mg total) by mouth every 8 (eight) hours. 40 tablet 1     No current facility-administered medications for this visit.        Review of Systems   Constitutional: Positive for fatigue. Negative for chills, fever and unexpected weight change.   HENT: Negative.    Eyes: Negative.    Respiratory: Negative.    Cardiovascular: Negative.    Gastrointestinal: Positive for abdominal pain, constipation and nausea. Negative for vomiting.   Endocrine: Positive for cold intolerance.   Genitourinary: Negative for difficulty urinating, dysuria, flank pain, frequency, hematuria, pelvic pain, urgency, vaginal discharge and vaginal pain.   Skin: Negative for rash (dry skin).   Neurological: Positive for light-headedness. " "Negative for dizziness.       Objective   /93 (BP Location: Left arm, Patient Position: Sitting, Cuff Size: Reg)   Pulse 83   Temp 36.2 °C (97.2 °F) (Temporal)   Resp 16   Ht 1.626 m (5' 4\")   Wt 96.2 kg (212 lb)   SpO2 99%   BMI 36.39 kg/m²     Physical Exam   Constitutional: She appears well-developed and well-nourished.   HENT:   Head: Normocephalic and atraumatic.   Nose: Nose normal.   Mouth/Throat: Oropharynx is clear and moist.   Eyes: Pupils are equal, round, and reactive to light. Conjunctivae and EOM are normal.   Neck: Normal range of motion. Neck supple.   Cardiovascular: Normal rate, regular rhythm and normal heart sounds.   No murmur heard.  Pulmonary/Chest: Effort normal and breath sounds normal. She has no wheezes.   Abdominal: Soft. Bowel sounds are normal. She exhibits no distension. There is tenderness (epigastrium and LLQ).   Neurological: She is alert.   Skin: Skin is warm and dry.   Nursing note and vitals reviewed.      LABS  Recent Results (from the past 2 hour(s))   CBC w/auto differential Blood, Venous    Collection Time: 04/23/19  8:55 AM   Result Value Ref Range    WBC 6.7 4.1 - 10.9 10*3/uL    RBC 4.68 4.05 - 5.48 10*6/µL    Hemoglobin 13.6 12.1 - 16.2 g/dL    Hematocrit 40.2 37.7 - 47.9 %    MCV 85.8 80.0 - 100.0 fL    MCH 29.0 26.0 - 34.0 pg    MCHC 33.8 31.0 - 36.0 g/dL    RDW 12.7 11.6 - 14.8 %    Platelets 192 140 - 440 10*3/uL    MPV 9.3 6.9 - 10.8 fL    Neutrophils% 60 47 - 80 %    Lymphocytes% 27 13 - 40 %    Monocytes% 9 2 - 11 %    Eosinophils% 3 0 - 5 %    Basophils% 1 0 - 2 %    Neutrophils Absolute 4.01 10*3/uL    Lymphocytes Absolute 1.82 10*3/uL    Monocytes Absolute 0.62 10*3/uL    Eosinophils Absolute 0.19 10*3/uL    Basophils Absolute 0.05 10*3/uL   Comprehensive metabolic panel Blood, Venous    Collection Time: 04/23/19  8:55 AM   Result Value Ref Range    Sodium 139 135 - 145 mmol/L    Potassium 4.2 3.6 - 5.0 mmol/L    Chloride 110 (H) 98 - 107 mmol/L    " CO2 25 21 - 32 mmol/L    Anion Gap 4 3 - 11 mmol/L    BUN 12 7 - 25 mg/dL    Creatinine 0.70 0.60 - 1.10 mg/dL    Glucose 84 70 - 105 mg/dL    Calcium 9.5 8.6 - 10.1 mg/dL    AST 19 0 - 37 U/L    ALT (SGPT) 25 0 - 77 U/L    Alkaline Phosphatase 78 37 - 98 U/L    Total Protein 6.9 6.4 - 8.2 g/dL    Albumin 3.7 3.4 - 5.0 g/dL    Total Bilirubin 0.70 0.00 - 1.40 mg/dL    eGFR 115 >60 mL/min/1.73m*2    Corrected Calcium 9.7 8.6 - 10.1 mg/dL   HCG, qualitative, urine Urine, Clean Catch    Collection Time: 04/23/19  8:55 AM   Result Value Ref Range    HCG qualitative, urine Negative    Urinalysis, dipstick Urine, Clean Catch    Collection Time: 04/23/19  8:55 AM   Result Value Ref Range    Color, Urine Yellow Yellow    Clarity, Urine Clear Clear    Specific Gravity, Urine 1.015 1.003 - 1.030    Leukocytes, Urine Negative Negative    Nitrite, Urine Negative Negative    Protein, Urine Negative Negative mg/dL    Ketones, Urine Negative Negative mg/dL    Urobilinogen, Urine 0.2 <2.0 E.U./dL    Bilirubin, Urine Negative Negative    Blood, Urine Negative Negative    Glucose, Urine Negative Negative mg/dL    pH, Urine 6.0 5.0 - 8.0 PH         Assessment/Plan   Diagnoses and all orders for this visit:    Generalized abdominal pain  -     CBC w/auto differential Blood, Venous  -     Comprehensive metabolic panel Blood, Venous  -     Thyroid Stimulating Hormone, Ultrasensitive Blood, Venous  -     HCG, qualitative, urine Urine, Clean Catch  -     Urinalysis, dipstick Urine, Clean Catch  -     X-ray abdomen 2 views AP with upright and or decubitus  -     Ambulatory referral to Family Practice; Future    Constipation, unspecified constipation type      Advised patient of her x-ray results.  Discussed the results of the CBC CMP hCG and urinalysis.  We will call her this afternoon with the results of her TSH.  Discussed constipation and treatment for this to include high-fiber diet.  Patient education given.  May also start to use  over-the-counter MiraLAX 1-2 capfuls daily to get bowels moving softly and regularly.  Patient does need to follow-up with primary care.  She does need referral.  Did place referral today and will schedule upon checkup for next available family medicine.  Patient will follow-up in urgent care as needed or with any questions or concerns.  Patient expresses understanding and agrees with plan of care.     ANABELLE KAPADIA DO  April 23, 2019 9:35 AM

## 2019-04-23 NOTE — PATIENT INSTRUCTIONS
1.  Recommend trial of MiraLAX 1 capful 1-2 times a day until stools are moving softly.  Recommend following the high-fiber and increased water diet as described in  patient education.if the MiraLAX seems to be too aggressive or is causing cramping may try over-the-counter stool softener such as docusate 1-2 tablets daily or increased fiber supplementation such as Metamucil.  2.  Recommend follow-up with primary care.  Referral placed today an appointment scheduled upon checkout  3. Call [phone] ([phone]) or return to the Urgent Care if your condition gets worse or does not go away. If the condition emergently worsens, call 911 or present to the Emergency Department.       Patient Education     Constipation, Adult  Constipation is when a person:  · Poops (has a bowel movement) fewer times in a week than normal.  · Has a hard time pooping.  · Has poop that is dry, hard, or bigger than normal.    Follow these instructions at home:  Eating and drinking    · Eat foods that have a lot of fiber, such as:  ? Fresh fruits and vegetables.  ? Whole grains.  ? Beans.  · Eat less of foods that are high in fat, low in fiber, or overly processed, such as:  ? French fries.  ? Hamburgers.  ? Cookies.  ? Candy.  ? Soda.  · Drink enough fluid to keep your pee (urine) clear or pale yellow.  General instructions  · Exercise regularly or as told by your doctor.  · Go to the restroom when you feel like you need to poop. Do not hold it in.  · Take over-the-counter and prescription medicines only as told by your doctor. These include any fiber supplements.  · Do pelvic floor retraining exercises, such as:  ? Doing deep breathing while relaxing your lower belly (abdomen).  ? Relaxing your pelvic floor while pooping.  · Watch your condition for any changes.  · Keep all follow-up visits as told by your doctor. This is important.  Contact a doctor if:  · You have pain that gets worse.  · You have a fever.  · You have not pooped for 4  days.  · You throw up (vomit).  · You are not hungry.  · You lose weight.  · You are bleeding from the anus.  · You have thin, pencil-like poop (stool).  Get help right away if:  · You have a fever, and your symptoms suddenly get worse.  · You leak poop or have blood in your poop.  · Your belly feels hard or bigger than normal (is bloated).  · You have very bad belly pain.  · You feel dizzy or you faint.  This information is not intended to replace advice given to you by your health care provider. Make sure you discuss any questions you have with your health care provider.  Document Released: 06/05/2009 Document Revised: 07/07/2017 Document Reviewed: 06/07/2017  [employer] Interactive Patient Education © 2018 [employer]

## 2019-04-29 ENCOUNTER — APPOINTMENT (OUTPATIENT)
Dept: LAB | Facility: CLINIC | Age: 34
End: 2019-04-29
Payer: COMMERCIAL

## 2019-04-29 ENCOUNTER — OFFICE VISIT (OUTPATIENT)
Dept: FAMILY MEDICINE | Facility: CLINIC | Age: 34
End: 2019-04-29
Payer: COMMERCIAL

## 2019-04-29 VITALS
OXYGEN SATURATION: 98 % | TEMPERATURE: 97.9 F | WEIGHT: 213 LBS | HEIGHT: 64 IN | DIASTOLIC BLOOD PRESSURE: 84 MMHG | HEART RATE: 81 BPM | BODY MASS INDEX: 36.37 KG/M2 | SYSTOLIC BLOOD PRESSURE: 132 MMHG

## 2019-04-29 DIAGNOSIS — R53.83 OTHER FATIGUE: ICD-10-CM

## 2019-04-29 DIAGNOSIS — R53.83 OTHER FATIGUE: Primary | ICD-10-CM

## 2019-04-29 DIAGNOSIS — L65.9 HAIR LOSS: ICD-10-CM

## 2019-04-29 DIAGNOSIS — R51.9 MORNING HEADACHE: ICD-10-CM

## 2019-04-29 DIAGNOSIS — E61.1 IRON DEFICIENCY: ICD-10-CM

## 2019-04-29 LAB
FERRITIN SERPL-MCNC: 21.7 NG/ML (ref 11–307)
IRON SATN MFR SERPL: 23 % (ref 13–50)
IRON SERPL-MCNC: 78 UG/DL (ref 50–175)
T4 FREE SERPL-MCNC: 0.87 NG/DL (ref 0.6–1.2)
TIBC SERPL-MCNC: 342 UG/DL (ref 250–450)
UIBC SERPL-MCNC: 264 UG/DL (ref 155–355)

## 2019-04-29 PROCEDURE — 83540 ASSAY OF IRON: CPT | Performed by: FAMILY MEDICINE

## 2019-04-29 PROCEDURE — 82306 VITAMIN D 25 HYDROXY: CPT | Performed by: FAMILY MEDICINE

## 2019-04-29 PROCEDURE — 99214 OFFICE O/P EST MOD 30 MIN: CPT | Performed by: FAMILY MEDICINE

## 2019-04-29 PROCEDURE — 82728 ASSAY OF FERRITIN: CPT | Performed by: FAMILY MEDICINE

## 2019-04-29 PROCEDURE — 84480 ASSAY TRIIODOTHYRONINE (T3): CPT | Performed by: FAMILY MEDICINE

## 2019-04-29 PROCEDURE — 36415 COLL VENOUS BLD VENIPUNCTURE: CPT | Performed by: FAMILY MEDICINE

## 2019-04-29 PROCEDURE — 84439 ASSAY OF FREE THYROXINE: CPT | Performed by: FAMILY MEDICINE

## 2019-04-29 PROCEDURE — 83550 IRON BINDING TEST: CPT | Performed by: FAMILY MEDICINE

## 2019-04-30 PROBLEM — E61.1 IRON DEFICIENCY: Status: ACTIVE | Noted: 2019-04-30

## 2019-04-30 PROBLEM — R51.9 MORNING HEADACHE: Status: ACTIVE | Noted: 2019-04-30

## 2019-04-30 RX ORDER — FERROUS SULFATE 325(65) MG
325 TABLET ORAL
Qty: 60 TABLET | Refills: 11
Start: 2019-04-30 | End: 2019-07-25 | Stop reason: ALTCHOICE

## 2019-04-30 RX ORDER — ASCORBIC ACID 500 MG
500 TABLET ORAL DAILY
Qty: 60 TABLET | Refills: 11
Start: 2019-04-30 | End: 2019-07-02 | Stop reason: ALTCHOICE

## 2019-04-30 NOTE — PROGRESS NOTES
"SUBJECTIVE:    Chief Complaint   Patient presents with   • Establish Care   • Fatigue   • Hair/Scalp Problem     Falling out more since having baby 7 monhs ago   • Headache     daily         Marycruz A [place] is a 34 y.o. old female who presents with the following concerns:    The patient has noticed generalized thinning of her hair for about 7 months.  She did have a baby 7 months ago and thought it was just likely hormonal.  She has had a TSH checked, but would like to check a free T4 and T3.  We discussed iron deficiency could be contributing to hair thinning as well and she would like to go ahead and have that checked today.    She does have significant fatigue.  She thinks that it is more than what she should have.  She does work early hours and has a baby, but still never feels refreshed.  She is also waking up in the morning with headaches.  She denies any snoring or witnessed apnea, but it is unclear if her bed partner would be able to noticed this because of his own snoring issues.  She is going up with the headaches and those are a bit bothersome to her.  We did discuss the possibility of doing a sleep study.  Her Buckeye sleep scale was obtained today and is 9.      The patient's past medical history, medications,  allergies and social history were reviewed in her electronic medical record at today's visit.    Review of Systems -   Positive for: Fatigue, hair thinning, morning headaches  Negative for: Depression, snoring, witnessed apnea, edema    OBJECTIVE:  /84 (BP Location: Right arm, Patient Position: Sitting, Cuff Size: Reg)   Pulse 81   Temp 36.6 °C (97.9 °F)   Ht 1.626 m (5' 4\")   Wt 96.6 kg (213 lb)   SpO2 98%   BMI 36.56 kg/m²   General appearance: alert, well appearing, and in no distress.  Pharynx- no significant tonsillar enlargement  Neck exam - supple, no significant adenopathy.  Thyroid- palpates normal, no nodules.  Chest: clear to auscultation, no wheezes, rales or rhonchi, " symmetric air entry.   CVS exam: normal rate, regular rhythm, carotids without bruits.  Abdominal exam: soft, non-tender, no palpable or pulsatile masses, no hepatosplenomegaly.  Exam of extremities: no pedal edema noted  Skin exam - no rashes noted; no jaundice.  She does have generalized hair thinning especially in the temporal region.    LABS:  Recent Results (from the past 48 hour(s))   Iron and TIBC Blood, Venous    Collection Time: 04/29/19  3:25 PM   Result Value Ref Range    Iron 78 50 - 175 ug/dL    TIBC 342 250 - 450 ug/dL    Iron Saturation 23 13 - 50 %    UIBC 264 155 - 355 ug/dL   Ferritin Blood, Venous    Collection Time: 04/29/19  3:25 PM   Result Value Ref Range    Ferritin 21.7 11.0 - 307.0 ng/mL   T4, free Blood, Venous    Collection Time: 04/29/19  3:25 PM   Result Value Ref Range    Free T4 0.87 0.60 - 1.20 ng/dL       DIAGNOSIS   Diagnosis Plan   1. Other fatigue  Iron panel Blood, Venous    T4, free Blood, Venous    T3 Blood, Venous    Iron and TIBC Blood, Venous    Ferritin Blood, Venous    25-Hydroxyvitamin D2 and D3, serum Blood, Venous   2. Hair loss  Iron panel Blood, Venous    T4, free Blood, Venous    T3 Blood, Venous    Iron and TIBC Blood, Venous    Ferritin Blood, Venous    25-Hydroxyvitamin D2 and D3, serum Blood, Venous   3. Iron deficiency  ferrous sulfate (FERROUSUL) 325 mg (65 mg iron) tablet    ascorbic acid, vitamin C, (ascorbic acid with silvestre hips) 500 mg tablet   4. Morning headache           PLAN:  She does have some mild iron deficiency.  We discussed a target ferritin of greater than 40.  She will start ferrous sulfate 325 and vitamin C 500 daily.    We discussed using spironolactone for her thinning hair.  She will give this some consideration.  Her basic metabolic panel is normal.  We discussed that she would need to have a BMP in a month after starting.    Consideration should be given for a sleep study.  The patient does have significant fatigue, morning headaches and  an Adel sleep scale of 9.      Lora Yates MD

## 2019-05-01 LAB — T3 SERPL-MCNC: 93 NG/DL (ref 80–200)

## 2019-05-04 LAB
25(OH)D2 SERPL-MCNC: <4 NG/ML
25(OH)D3 SERPL-MCNC: 24 NG/ML
25(OH)D3+25(OH)D2 SERPL-MCNC: 24 NG/ML

## 2019-07-02 ENCOUNTER — OFFICE VISIT (OUTPATIENT)
Dept: FAMILY MEDICINE | Facility: CLINIC | Age: 34
End: 2019-07-02
Payer: COMMERCIAL

## 2019-07-02 VITALS
HEART RATE: 81 BPM | SYSTOLIC BLOOD PRESSURE: 126 MMHG | OXYGEN SATURATION: 97 % | TEMPERATURE: 97.8 F | DIASTOLIC BLOOD PRESSURE: 78 MMHG

## 2019-07-02 DIAGNOSIS — L92.0 GRANULOMA ANNULARE: ICD-10-CM

## 2019-07-02 DIAGNOSIS — B37.31 YEAST VAGINITIS: Primary | ICD-10-CM

## 2019-07-02 PROCEDURE — 99213 OFFICE O/P EST LOW 20 MIN: CPT | Performed by: FAMILY MEDICINE

## 2019-07-02 RX ORDER — TRIAMCINOLONE ACETONIDE 5 MG/G
OINTMENT TOPICAL 2 TIMES DAILY
Qty: 15 G | Refills: 0 | Status: SHIPPED | OUTPATIENT
Start: 2019-07-02 | End: 2019-07-25 | Stop reason: ALTCHOICE

## 2019-07-02 RX ORDER — FLUCONAZOLE 100 MG/1
100 TABLET ORAL DAILY
Qty: 7 TABLET | Refills: 0 | Status: SHIPPED | OUTPATIENT
Start: 2019-07-02 | End: 2019-07-25 | Stop reason: ALTCHOICE

## 2019-07-02 ASSESSMENT — PAIN SCALES - GENERAL: PAINLEVEL: 0-NO PAIN

## 2019-07-02 NOTE — PROGRESS NOTES
SUBJECTIVE:    Chief Complaint   Patient presents with   • Skin Problem     Left, top of hand; burns and itches   • Vaginal Itching     with burning, occasional         Marycruz A [place] is a 34 y.o. old female who presents today with complaints of a skin rash on her L hand, dorsal surface.  It has been present for 2 month(s) She has not had anything like this in the past.  Precipitating factors include none known.  Associated symptoms include mild itching.  There has not been any drainage from the area.  She was concerned she may have ring worm.  No specific treatments have been tried.    She also reports vaginal itching/discharge consistent with yeast.  Her baby currently has thrush.  I have advised treating her with oral diflucan 100 mg daily x 7 days.    The patient's past medical history, medications, allergies and social history were reviewed in her electronic medical record at today's visit.    ROS:  Positive for skin rash, vaginal itching/discharge.    OBJECTIVE:  /78 (BP Location: Right arm, Patient Position: Sitting, Cuff Size: Reg)   Pulse 81   Temp 36.6 °C (97.8 °F)   SpO2 97%    Skin: she has a 1.5 cm erythematous lesion with raised pearly border on the dorsal surface of her L hand, consistent with granuloma annulare.  There is no drainage or evidence of secondary infection.    ASSESSMENT:   Diagnosis Plan   1. Yeast vaginitis  fluconazole (DIFLUCAN) 100 mg tablet   2. Granuloma annulare  triamcinolone (KENALOG) 0.5 % ointment       PLAN:  I have advised treating her granuloma annulare with topical steroid ointment.  Advised if it takes longer than 6 weeks to clear, she should let me know.    Treat yeast vaginitis with diflucan 100 mg daily x 7 days.    If symptoms worsen or do not improve, return for re-evaluation.    No follow-ups on file.    Lora Yates MD

## 2019-07-25 ENCOUNTER — HOSPITAL ENCOUNTER (EMERGENCY)
Facility: HOSPITAL | Age: 34
Discharge: HOME | End: 2019-07-25
Attending: EMERGENCY MEDICINE
Payer: COMMERCIAL

## 2019-07-25 ENCOUNTER — OFFICE VISIT (OUTPATIENT)
Dept: URGENT CARE | Facility: URGENT CARE | Age: 34
End: 2019-07-25
Payer: COMMERCIAL

## 2019-07-25 VITALS
DIASTOLIC BLOOD PRESSURE: 74 MMHG | SYSTOLIC BLOOD PRESSURE: 126 MMHG | HEART RATE: 55 BPM | RESPIRATION RATE: 14 BRPM | BODY MASS INDEX: 36.86 KG/M2 | WEIGHT: 214.73 LBS | TEMPERATURE: 98.2 F | OXYGEN SATURATION: 97 %

## 2019-07-25 VITALS
TEMPERATURE: 97.7 F | RESPIRATION RATE: 12 BRPM | WEIGHT: 211 LBS | OXYGEN SATURATION: 99 % | HEART RATE: 88 BPM | HEIGHT: 64 IN | DIASTOLIC BLOOD PRESSURE: 88 MMHG | SYSTOLIC BLOOD PRESSURE: 122 MMHG | BODY MASS INDEX: 36.02 KG/M2

## 2019-07-25 DIAGNOSIS — G43.909 MIGRAINE HEADACHE: Primary | ICD-10-CM

## 2019-07-25 DIAGNOSIS — G43.919 INTRACTABLE MIGRAINE WITHOUT STATUS MIGRAINOSUS, UNSPECIFIED MIGRAINE TYPE: Primary | ICD-10-CM

## 2019-07-25 PROCEDURE — 96375 TX/PRO/DX INJ NEW DRUG ADDON: CPT

## 2019-07-25 PROCEDURE — 6360000200 HC RX 636 W HCPCS (ALT 250 FOR IP): Performed by: PHYSICIAN ASSISTANT

## 2019-07-25 PROCEDURE — 99213 OFFICE O/P EST LOW 20 MIN: CPT | Mod: 25 | Performed by: FAMILY MEDICINE

## 2019-07-25 PROCEDURE — 2580000300 HC RX 258: Performed by: PHYSICIAN ASSISTANT

## 2019-07-25 PROCEDURE — 99284 EMERGENCY DEPT VISIT MOD MDM: CPT | Performed by: EMERGENCY MEDICINE

## 2019-07-25 PROCEDURE — 96374 THER/PROPH/DIAG INJ IV PUSH: CPT

## 2019-07-25 PROCEDURE — 96361 HYDRATE IV INFUSION ADD-ON: CPT

## 2019-07-25 PROCEDURE — 96372 THER/PROPH/DIAG INJ SC/IM: CPT | Performed by: FAMILY MEDICINE

## 2019-07-25 PROCEDURE — 96372 THER/PROPH/DIAG INJ SC/IM: CPT

## 2019-07-25 RX ORDER — ONDANSETRON HYDROCHLORIDE 2 MG/ML
4 INJECTION, SOLUTION INTRAVENOUS ONCE
Status: COMPLETED | OUTPATIENT
Start: 2019-07-25 | End: 2019-07-25

## 2019-07-25 RX ORDER — DIPHENHYDRAMINE HYDROCHLORIDE 50 MG/ML
25 INJECTION INTRAMUSCULAR; INTRAVENOUS ONCE
Status: COMPLETED | OUTPATIENT
Start: 2019-07-25 | End: 2019-07-25

## 2019-07-25 RX ORDER — SODIUM CHLORIDE 9 MG/ML
1000 INJECTION, SOLUTION INTRAVENOUS ONCE
Status: COMPLETED | OUTPATIENT
Start: 2019-07-25 | End: 2019-07-25

## 2019-07-25 RX ORDER — PROCHLORPERAZINE EDISYLATE 5 MG/ML
10 INJECTION INTRAMUSCULAR; INTRAVENOUS ONCE
Status: COMPLETED | OUTPATIENT
Start: 2019-07-25 | End: 2019-07-25

## 2019-07-25 RX ORDER — KETOROLAC TROMETHAMINE 30 MG/ML
60 INJECTION, SOLUTION INTRAMUSCULAR; INTRAVENOUS ONCE
Status: COMPLETED | OUTPATIENT
Start: 2019-07-25 | End: 2019-07-25

## 2019-07-25 RX ORDER — DEXAMETHASONE SODIUM PHOSPHATE 4 MG/ML
4 INJECTION, SOLUTION INTRA-ARTICULAR; INTRALESIONAL; INTRAMUSCULAR; INTRAVENOUS; SOFT TISSUE ONCE
Status: COMPLETED | OUTPATIENT
Start: 2019-07-25 | End: 2019-07-25

## 2019-07-25 RX ORDER — ONDANSETRON 4 MG/1
4 TABLET, ORALLY DISINTEGRATING ORAL ONCE
Status: COMPLETED | OUTPATIENT
Start: 2019-07-25 | End: 2019-07-25

## 2019-07-25 RX ADMIN — KETOROLAC TROMETHAMINE 60 MG: 30 INJECTION, SOLUTION INTRAMUSCULAR; INTRAVENOUS at 11:12

## 2019-07-25 RX ADMIN — PROCHLORPERAZINE EDISYLATE 10 MG: 5 INJECTION INTRAMUSCULAR; INTRAVENOUS at 15:24

## 2019-07-25 RX ADMIN — DIPHENHYDRAMINE HYDROCHLORIDE 25 MG: 50 INJECTION INTRAMUSCULAR; INTRAVENOUS at 15:20

## 2019-07-25 RX ADMIN — SODIUM CHLORIDE 1000 ML: 9 INJECTION, SOLUTION INTRAVENOUS at 15:19

## 2019-07-25 RX ADMIN — ONDANSETRON 4 MG: 2 INJECTION INTRAMUSCULAR; INTRAVENOUS at 15:19

## 2019-07-25 RX ADMIN — DEXAMETHASONE SODIUM PHOSPHATE 4 MG: 4 INJECTION, SOLUTION INTRA-ARTICULAR; INTRALESIONAL; INTRAMUSCULAR; INTRAVENOUS; SOFT TISSUE at 15:22

## 2019-07-25 RX ADMIN — ONDANSETRON 4 MG: 4 TABLET, ORALLY DISINTEGRATING ORAL at 11:12

## 2019-07-25 ASSESSMENT — ENCOUNTER SYMPTOMS
CONSTIPATION: 0
APPETITE CHANGE: 0
CHILLS: 0
NAUSEA: 1
ACTIVITY CHANGE: 0
WEAKNESS: 0
MYALGIAS: 0
SINUS PRESSURE: 0
ABDOMINAL PAIN: 0
DIAPHORESIS: 0
PHOTOPHOBIA: 1
VOICE CHANGE: 0
HEADACHES: 1
CHEST TIGHTNESS: 0
PSYCHIATRIC NEGATIVE: 1
DIFFICULTY URINATING: 0
ARTHRALGIAS: 0
TROUBLE SWALLOWING: 0
UNEXPECTED WEIGHT CHANGE: 0
WHEEZING: 0
NECK PAIN: 0
BLOOD IN STOOL: 0
DIARRHEA: 0
FEVER: 0
DIZZINESS: 0
VOMITING: 1
FATIGUE: 0
RHINORRHEA: 0
SHORTNESS OF BREATH: 0
SINUS PAIN: 0
COUGH: 0
SORE THROAT: 0
LIGHT-HEADEDNESS: 0

## 2019-07-25 ASSESSMENT — PAIN SCALES - GENERAL: PAINLEVEL: 8

## 2019-07-25 NOTE — ED PROVIDER NOTES
HPI:  Chief Complaint   Patient presents with   • Headache     pt c/o headache since last night was sent from urgent care       34-year-old female presented to be evaluated for migraine headache.  She noticed a headache coming on last night and it was preceded by a bit of blurry vision headache which seems to be mostly centered behind her right.  She reports frequent headaches in the past and has been diagnosed with migraines in the past.  Headaches seem to begin to increase again after her pregnancy/childbirth 10 months ago.  No trauma or injury.  No fever or chills.    Describes as a dull/throbbing and aching sensation.  The vision changes improved but she does have some photosensitivity.  Sound sensitivity as well.  Nauseous.          HISTORY:  Past Medical History:   Diagnosis Date   • Acid reflux     takes prilosec   • Delivery by elective  section 2018   • Depression     situational-2yrs ago   • Gastrointestinal disease    • Iron deficiency 2019   • Morning headache 2019   • Obesity (BMI 30-39.9)    • Varicella        Past Surgical History:   Procedure Laterality Date   •  SECTION     •  SECTION, LOW TRANSVERSE N/A 2018    Procedure:  SECTION;  Surgeon: Naeem Miranda MD;  Location: [facility] L&D OR;  Service: Obstetrics;  Laterality: N/A;   • TONSILLECTOMY         Family History   Problem Relation Age of Onset   • Diabetes Mother 50         from complications   • Heart failure Mother    • Multiple sclerosis Mother    • Thyroid disease Mother    • No Known Problems Father    • ADD / ADHD Son        Social History     Tobacco Use   • Smoking status: Current Every Day Smoker     Packs/day: 0.20     Years: 15.00     Pack years: 3.00     Types: Cigarettes     Last attempt to quit: 2018     Years since quittin.5   • Smokeless tobacco: Never Used   • Tobacco comment: 1 cig/day   Substance Use Topics   • Alcohol use: No     Alcohol/week: 0.0 oz   • Drug  use: No         ROS:  Review of Systems   Constitutional: Negative for activity change, appetite change, chills, diaphoresis, fatigue, fever and unexpected weight change.   HENT: Negative for congestion, ear pain, mouth sores, postnasal drip, rhinorrhea, sinus pressure, sinus pain, sore throat, trouble swallowing and voice change.    Eyes: Positive for photophobia.   Respiratory: Negative for cough, chest tightness, shortness of breath and wheezing.    Cardiovascular: Negative for chest pain and leg swelling.   Gastrointestinal: Positive for nausea and vomiting. Negative for abdominal pain, blood in stool, constipation and diarrhea.   Genitourinary: Negative.  Negative for difficulty urinating.   Musculoskeletal: Negative for arthralgias, myalgias and neck pain.   Skin: Negative for rash.   Neurological: Positive for headaches. Negative for dizziness, weakness and light-headedness.   Psychiatric/Behavioral: Negative.        PE:  ED Triage Vitals   Temp Heart Rate Resp BP SpO2   07/25/19 1213 07/25/19 1213 07/25/19 1213 07/25/19 1213 07/25/19 1213   36.8 °C (98.2 °F) 72 16 155/92 100 %      Temp src Heart Rate Source Patient Position BP Location FiO2 (%)   -- -- 07/25/19 1432 -- --     Sitting         Physical Exam   Constitutional: She is oriented to person, place, and time. Vital signs are normal. She appears well-developed and well-nourished. No distress.   HENT:   Head: Normocephalic and atraumatic.   Eyes: Pupils are equal, round, and reactive to light. EOM are normal. Right eye exhibits no discharge. Left eye exhibits no discharge.   Neck: Normal range of motion. Neck supple. No tracheal deviation present.   Cardiovascular: Normal rate, regular rhythm and normal heart sounds.   Pulmonary/Chest: Effort normal and breath sounds normal. No stridor. No respiratory distress.   Musculoskeletal: Normal range of motion.   Neurological: She is alert and oriented to person, place, and time. She exhibits normal muscle  tone. Coordination normal.   Skin: Capillary refill takes less than 2 seconds. She is not diaphoretic.   Psychiatric: She has a normal mood and affect. Her behavior is normal. Judgment and thought content normal.   Nursing note and vitals reviewed.      ED LABS:  Labs Reviewed - No data to display      ED IMAGES:  No orders to display       ED PROCEDURES:  Procedures    ED COURSE:            MDM:  MDM  Number of Diagnoses or Management Options  Migraine headache:   Diagnosis management comments: 34-year-old female presenting to be evaluated for migraine headache.  Gets good improvement with given medications.  Requesting discharge home and will follow with primary care.    Medications  diphenhydrAMINE (BENADRYL) injection 25 mg (25 mg intravenous Given 7/25/19 1520)  dexamethasone (DECADRON) injection 4 mg (4 mg intravenous Given 7/25/19 1522)  sodium chloride 0.9 % bolus 1,000 mL (0 mL intravenous Stopped 7/25/19 1609)  prochlorperazine (COMPAZINE) injection 10 mg (10 mg intramuscular Given 7/25/19 1524)  ondansetron (ZOFRAN) injection 4 mg (4 mg intravenous Given 7/25/19 1519)       Amount and/or Complexity of Data Reviewed  Review and summarize past medical records: yes    Risk of Complications, Morbidity, and/or Mortality  Presenting problems: moderate  Management options: moderate    Patient Progress  Patient progress: improved      Final diagnoses:   [G43.909] Migraine headache   Nausea  Vomiting       BOY Iniguez  07/25/19 2021

## 2019-07-25 NOTE — DISCHARGE INSTRUCTIONS
Please return to the ER as needed.  I do recommend following with your primary care provider for recheck.

## 2019-07-25 NOTE — ED ATTESTATION NOTE
I performed a history and physical examination of Marycruz Locke and discussed her   management with Advanced Practice Provider, Sage BRUNSON.  I agree with the history, physical, assessment, and plan of care.  On my face-to-face visit with patient, NAD, had complete resolution of her symptoms with routine migraine medication treatment here.  She states this felt like a migraine.  This was typical of previous migraines.  She has had no focal weakness, numbness, slurred speech or altered mental status.  No fevers.  No stiff neck.  I have no concerns for intracranial pathology or meningitis.  I have discussed with her with the change in frequency of her chronic migraine she may want to follow-up with her primary care provider, they can consider referral to neurology or outpatient imaging as needed but do not feel emergent imaging is warranted today.  Do not feel risk of radiation is necessary with a normal neurological exam.  She is very happy with this plan will be discharged home          MD Betina WRIGHT MD  07/25/19 2953

## 2019-07-26 NOTE — PROGRESS NOTES
Subjective      Patient ID: Marycruz Locke is a 34 y.o. female.    HPI 34-year-old female who presents with right frontal headache, throbbing and dull.  Headache started yesterday associated with nausea, sensitivity to light, sensitivity to sound.  She also reports paresthesia over right forehead.  Headache came on slowly not suddenly.  This is not the worst headache of her life.  She is nauseated.  She does report history of morning headache for several months on and off.  She reports several years ago she had migraine headaches but has not had one for several years.  She denies any sinus pressure, nasal congestion.  She did does report blurring vision at the beginning of the headache but denies blurring vision at this time.    The following have been reviewed and updated as appropriate in this visit:       Review of Systems  See HPI  Objective     Physical Exam  General: She is alert oriented in mild distress  HEENT: Normocephalic atraumatic pupils are equal round reactive light extra ocular movements are intact on the drier side no lymphadenopathy.  External auditory canals and tympanic remains are normal nasal mucosa normal  Respiratory: Clear to auscultation bilaterally  CV: Regular rate and rhythm no murmurs no rubs  Neuro: Cranial nerves II through XII with diminished sensation over right V1 distribution area otherwise intact without abnormality.  Finger-to-nose testing normal, cerebellar testing negative, pronator drift negative    Assessment/Plan   Diagnoses and all orders for this visit:    Intractable migraine without status migrainosus, unspecified migraine type  -     ketorolac (TORADOL) injection 60 mg  -     ondansetron ODT (ZOFRAN-ODT) disintegrating tablet 4 mg    1.  Migraine headache: I suspect migraine headache patient was given 60 mg of Toradol 4 mg of Zofran and had improvement in her nausea but persistence of 7 out of 10 headache after observation 20 to 30 minutes.  Offered IV fluids patient  declined.  We discussed that stronger additional pain medication is not available for administration here and if pain is intolerable then going to the emergency department would be reasonable.  We discussed that with her morning headache on and off for the last 6 months and not having migraine headache for many years that CT of the head may be considered to rule out intracranial mass.  Transfer center was notified of patient's decision to proceed to the emergency department.

## 2019-10-25 ENCOUNTER — HOSPITAL ENCOUNTER (EMERGENCY)
Facility: HOSPITAL | Age: 34
Discharge: HOME | End: 2019-10-25
Attending: EMERGENCY MEDICINE
Payer: COMMERCIAL

## 2019-10-25 ENCOUNTER — APPOINTMENT (OUTPATIENT)
Dept: RADIOLOGY | Facility: HOSPITAL | Age: 34
End: 2019-10-25
Payer: COMMERCIAL

## 2019-10-25 VITALS
BODY MASS INDEX: 38.36 KG/M2 | TEMPERATURE: 98.8 F | RESPIRATION RATE: 18 BRPM | OXYGEN SATURATION: 97 % | HEART RATE: 56 BPM | HEIGHT: 63 IN | SYSTOLIC BLOOD PRESSURE: 112 MMHG | DIASTOLIC BLOOD PRESSURE: 82 MMHG | WEIGHT: 216.49 LBS

## 2019-10-25 DIAGNOSIS — R07.9 CHEST PAIN: Primary | ICD-10-CM

## 2019-10-25 LAB
ANION GAP SERPL CALC-SCNC: 10 MMOL/L (ref 3–11)
BUN SERPL-MCNC: 12 MG/DL (ref 7–25)
CALCIUM SERPL-MCNC: 9.7 MG/DL (ref 8.6–10.3)
CHLORIDE SERPL-SCNC: 111 MMOL/L (ref 98–107)
CO2 SERPL-SCNC: 21 MMOL/L (ref 21–32)
CREAT SERPL-MCNC: 0.65 MG/DL (ref 0.6–1.2)
DELTA HIGH SENSITIVITY TROPONIN I, 1 HOUR: NORMAL
ERYTHROCYTE [DISTWIDTH] IN BLOOD BY AUTOMATED COUNT: 13.5 % (ref 11.5–14)
FIBRIN D-DIMER (NG/ML) IN PLATELET POOR PLASMA: 254 NG/ML
GFR SERPL CREATININE-BSD FRML MDRD: 117 ML/MIN/1.73M*2
GLUCOSE SERPL-MCNC: 95 MG/DL (ref 70–105)
HCT VFR BLD AUTO: 40.5 % (ref 34–45)
HGB BLD-MCNC: 13.8 G/DL (ref 11.5–15.5)
MCH RBC QN AUTO: 29.6 PG (ref 28–33)
MCHC RBC AUTO-ENTMCNC: 34.1 G/DL (ref 32–36)
MCV RBC AUTO: 86.9 FL (ref 81–97)
PLATELET # BLD AUTO: 162 10*3/UL (ref 140–350)
PMV BLD AUTO: 9.7 FL (ref 6.9–10.8)
POTASSIUM SERPL-SCNC: 3.9 MMOL/L (ref 3.5–5.1)
RBC # BLD AUTO: 4.66 10*6/ΜL (ref 3.7–5.3)
SODIUM SERPL-SCNC: 142 MMOL/L (ref 135–145)
TROPONIN I SERPL-MCNC: <2.3 PG/ML
TROPONIN I SERPL-MCNC: <2.3 PG/ML
WBC # BLD AUTO: 5 10*3/UL (ref 4.5–10.5)

## 2019-10-25 PROCEDURE — 99284 EMERGENCY DEPT VISIT MOD MDM: CPT | Performed by: EMERGENCY MEDICINE

## 2019-10-25 PROCEDURE — 85027 COMPLETE CBC AUTOMATED: CPT | Performed by: EMERGENCY MEDICINE

## 2019-10-25 PROCEDURE — 93005 ELECTROCARDIOGRAM TRACING: CPT

## 2019-10-25 PROCEDURE — 71046 X-RAY EXAM CHEST 2 VIEWS: CPT

## 2019-10-25 PROCEDURE — 84484 ASSAY OF TROPONIN QUANT: CPT | Performed by: EMERGENCY MEDICINE

## 2019-10-25 PROCEDURE — 80048 BASIC METABOLIC PNL TOTAL CA: CPT | Performed by: EMERGENCY MEDICINE

## 2019-10-25 PROCEDURE — 36415 COLL VENOUS BLD VENIPUNCTURE: CPT | Performed by: EMERGENCY MEDICINE

## 2019-10-25 PROCEDURE — 85379 FIBRIN DEGRADATION QUANT: CPT | Performed by: EMERGENCY MEDICINE

## 2019-10-25 ASSESSMENT — HEART SCORE
ECG: NORMAL
HEART SCORE: 0
HISTORY: SLIGHTLY SUSPICIOUS
AGE: <45
TROPONIN: LESS THAN OR EQUAL TO NORMAL LIMIT
RISK FACTORS: NO KNOWN RISK FACTORS

## 2019-10-25 ASSESSMENT — PAIN DESCRIPTION - DESCRIPTORS: DESCRIPTORS: NAGGING;PRESSURE

## 2019-10-25 NOTE — ED PROVIDER NOTES
HPI:  Chief Complaint   Patient presents with   • Chest Pain     Pt states around 0400 this am she was awoken by sudden chest pain + nausea       Patient presents to the emergency department the chief complaint of chest pain and back pain.  Patient states she was awoken from sleep about 4 AM this morning with a sharp painWhich she will localizes to the central mid upper chest that she states radiates into the back above her shoulder blade.  She states is a constant pain made worse with deep breathing.  She denies any retrosternal chest pain, history of similar pains, hemoptysis, leg pain or swelling, fever, chills or cough.          HISTORY:  Past Medical History:   Diagnosis Date   • Acid reflux     takes prilosec   • Delivery by elective  section 2018   • Depression     situational-2yrs ago   • Gastrointestinal disease    • Iron deficiency 2019   • Morning headache 2019   • Obesity (BMI 30-39.9)    • Varicella        Past Surgical History:   Procedure Laterality Date   •  SECTION     •  SECTION, LOW TRANSVERSE N/A 2018    Procedure:  SECTION;  Surgeon: Naeem Miranda MD;  Location: [facility] L&D OR;  Service: Obstetrics;  Laterality: N/A;   • TONSILLECTOMY         Family History   Problem Relation Age of Onset   • Diabetes Mother 50         from complications   • Heart failure Mother    • Multiple sclerosis Mother    • Thyroid disease Mother    • No Known Problems Father    • ADD / ADHD Son        Social History     Tobacco Use   • Smoking status: Current Every Day Smoker     Packs/day: 0.20     Years: 15.00     Pack years: 3.00     Types: Cigarettes     Last attempt to quit: 2018     Years since quittin.8   • Smokeless tobacco: Never Used   • Tobacco comment: 1 cig/day   Substance Use Topics   • Alcohol use: No     Alcohol/week: 0.0 standard drinks   • Drug use: No         ROS:  Review of Systems  As per the system review documented in the history  otherwise considered non-contributory and/or negative  PE:  ED Triage Vitals   Temp Heart Rate Resp BP SpO2   10/25/19 0724 10/25/19 0721 10/25/19 0721 10/25/19 0721 10/25/19 0721   37.1 °C (98.8 °F) 83 18 147/90 98 %      Temp Source Heart Rate Source Patient Position BP Location FiO2 (%)   10/25/19 0724 10/25/19 0730 -- 10/25/19 0730 --   Oral Monitor  Right arm        Physical Exam  Patient is awake alert in mild anxious distress.  Vital signs are stable.  HEENT normocephalic atraumatic.  No sub-conjunctival pallor.  No respiratory distress.  Breath sounds are clear bilaterally without adventitious sounds.  Heart regular rate and rhythm.  No chest wall tenderness.  She does have tenderness to the mid left suprascapular back reproducing pain of chief complaint.  Abdomen soft nontender.  Extremities nontender without swelling.  Mood and affect are normal.  ED LABS:  Labs Reviewed   BASIC METABOLIC PANEL - Abnormal       Result Value    Sodium 142      Potassium 3.9      Chloride 111 (*)     CO2 21      BUN 12      Creatinine 0.65      Glucose 95      Calcium 9.7      Anion Gap 10      eGFR 117      Narrative:     Estimated GFR calculated using the 2009 CKD-EPI creatinine equation.   D-DIMER, QUANTITATIVE - Normal    D-Dimer, Quant (ng/mL) 254      Narrative:     D-dimer values < or =500 ng/mL FEU may be used in conjunction with clinical pretest probability to exclude deep vein thrombosis (DVT) and pulmonary embolism (PE).   CBC - Normal    WBC 5.0      RBC 4.66      Hemoglobin 13.8      Hematocrit 40.5      MCV 86.9      MCH 29.6      MCHC 34.1      RDW 13.5      Platelets 162      MPV 9.7     HIGH SENSITIVE TROPONIN I - Normal    hsTnI 0 Hour <2.3           ED IMAGES:  X-ray chest 2 views   Final Result   IMPRESSION:    Normal chest.          ED PROCEDURES:  ECG 12 lead, Chest Pain  Date/Time: 10/25/2019 8:18 AM  Performed by: Brett Brown MD  Authorized by: Brett Brown MD     Previous ECG:     Previous  ECG:  Compared to current  Interpretation:     Interpretation: normal    Rate:     ECG rate assessment: normal    Rhythm:     Rhythm: sinus rhythm    Ectopy:     Ectopy: none    QRS:     QRS axis:  Normal  Conduction:     Conduction: normal    ST segments:     ST segments:  Normal        ED COURSE:       Heart score calculated equals 0          MDM:  MDM  Patient with a negative d-dimer and a heart score is 0 who has chest and back pain with the pain reproducible to the muscular skeletal area is considered to be considerable low risk and slightly suspicious as cardiac etiology is even overstretch.  I suspect this is a spontaneous resolving self-limited process which patient however is given very clear-cut instructions in regards to conditions of need for reevaluation  Final diagnoses:   [R07.9] Chest pain        Brett Brown MD  10/29/19 2769

## 2019-11-17 ENCOUNTER — HOSPITAL ENCOUNTER (EMERGENCY)
Facility: HOSPITAL | Age: 34
Discharge: HOME | End: 2019-11-17
Attending: EMERGENCY MEDICINE
Payer: COMMERCIAL

## 2019-11-17 ENCOUNTER — APPOINTMENT (OUTPATIENT)
Dept: CT IMAGING | Facility: HOSPITAL | Age: 34
End: 2019-11-17
Payer: COMMERCIAL

## 2019-11-17 VITALS
BODY MASS INDEX: 36.62 KG/M2 | TEMPERATURE: 97.7 F | WEIGHT: 214.51 LBS | SYSTOLIC BLOOD PRESSURE: 118 MMHG | HEART RATE: 93 BPM | HEIGHT: 64 IN | OXYGEN SATURATION: 97 % | RESPIRATION RATE: 20 BRPM | DIASTOLIC BLOOD PRESSURE: 73 MMHG

## 2019-11-17 DIAGNOSIS — R19.7 DIARRHEA: ICD-10-CM

## 2019-11-17 DIAGNOSIS — R10.9 ABDOMINAL CRAMPING: ICD-10-CM

## 2019-11-17 DIAGNOSIS — B34.9 VIRAL ILLNESS: Primary | ICD-10-CM

## 2019-11-17 DIAGNOSIS — R11.2 NAUSEA AND VOMITING: ICD-10-CM

## 2019-11-17 DIAGNOSIS — M54.50 LOW BACK PAIN: ICD-10-CM

## 2019-11-17 LAB
ALBUMIN SERPL-MCNC: 4.2 G/DL (ref 3.5–5.3)
ALP SERPL-CCNC: 97 U/L (ref 37–98)
ALT SERPL-CCNC: 14 U/L (ref 0–52)
ANION GAP SERPL CALC-SCNC: 9 MMOL/L (ref 3–11)
AST SERPL-CCNC: 15 U/L (ref 0–39)
BACTERIA #/AREA URNS AUTO: NORMAL /HPF
BASOPHILS # BLD AUTO: 0.1 10*3/UL
BASOPHILS NFR BLD AUTO: 1 % (ref 0–2)
BILIRUB SERPL-MCNC: 1.07 MG/DL (ref 0–1.4)
BILIRUB UR QL STRIP.AUTO: NEGATIVE
BUN SERPL-MCNC: 16 MG/DL (ref 7–25)
CALCIUM ALBUM COR SERPL-MCNC: 8.9 MG/DL (ref 8.6–10.3)
CALCIUM SERPL-MCNC: 9.1 MG/DL (ref 8.6–10.3)
CHLORIDE SERPL-SCNC: 110 MMOL/L (ref 98–107)
CLARITY UR: CLEAR
CO2 SERPL-SCNC: 19 MMOL/L (ref 21–32)
COLOR UR: YELLOW
CREAT SERPL-MCNC: 0.56 MG/DL (ref 0.6–1.2)
EOSINOPHIL # BLD AUTO: 0.1 10*3/UL
EOSINOPHIL NFR BLD AUTO: 1 % (ref 0–3)
ERYTHROCYTE [DISTWIDTH] IN BLOOD BY AUTOMATED COUNT: 13.7 % (ref 11.5–14)
GFR SERPL CREATININE-BSD FRML MDRD: 123 ML/MIN/1.73M*2
GLUCOSE SERPL-MCNC: 88 MG/DL (ref 70–105)
GLUCOSE UR STRIP.AUTO-MCNC: NEGATIVE MG/DL
HCT VFR BLD AUTO: 44.1 % (ref 34–45)
HGB BLD-MCNC: 15.1 G/DL (ref 11.5–15.5)
HGB UR QL STRIP.AUTO: NEGATIVE
KETONES UR STRIP.AUTO-MCNC: 20 MG/DL
LEUKOCYTE ESTERASE UR QL STRIP: NEGATIVE
LIPASE SERPL-CCNC: 6 U/L (ref 11–82)
LYMPHOCYTES # BLD AUTO: 0.5 10*3/UL
LYMPHOCYTES NFR BLD AUTO: 5 % (ref 11–47)
MCH RBC QN AUTO: 29.7 PG (ref 28–33)
MCHC RBC AUTO-ENTMCNC: 34.2 G/DL (ref 32–36)
MCV RBC AUTO: 86.9 FL (ref 81–97)
MONOCYTES # BLD AUTO: 0.6 10*3/UL
MONOCYTES NFR BLD AUTO: 6 % (ref 3–11)
NEUTROPHILS # BLD AUTO: 8.9 10*3/UL
NEUTROPHILS NFR BLD AUTO: 89 % (ref 41–81)
NITRITE UR QL STRIP.AUTO: NEGATIVE
PH UR STRIP.AUTO: 5 PH
PLATELET # BLD AUTO: 166 10*3/UL (ref 140–350)
PMV BLD AUTO: 9.3 FL (ref 6.9–10.8)
POTASSIUM SERPL-SCNC: 3.9 MMOL/L (ref 3.5–5.1)
PROT SERPL-MCNC: 6.9 G/DL (ref 6–8.3)
PROT UR STRIP.AUTO-MCNC: NEGATIVE MG/DL
RBC # BLD AUTO: 5.08 10*6/ΜL (ref 3.7–5.3)
RBC #/AREA URNS AUTO: NORMAL /HPF
SODIUM SERPL-SCNC: 138 MMOL/L (ref 135–145)
SP GR UR STRIP.AUTO: >1.06 (ref 1–1.03)
SQUAMOUS #/AREA URNS AUTO: NORMAL /HPF
UROBILINOGEN UR STRIP.AUTO-MCNC: <2 E.U./DL
WBC # BLD AUTO: 10.1 10*3/UL (ref 4.5–10.5)
WBC #/AREA URNS AUTO: NORMAL /HPF

## 2019-11-17 PROCEDURE — 85025 COMPLETE CBC W/AUTO DIFF WBC: CPT | Performed by: NURSE PRACTITIONER

## 2019-11-17 PROCEDURE — 80053 COMPREHEN METABOLIC PANEL: CPT | Performed by: NURSE PRACTITIONER

## 2019-11-17 PROCEDURE — 81001 URINALYSIS AUTO W/SCOPE: CPT | Performed by: NURSE PRACTITIONER

## 2019-11-17 PROCEDURE — 74177 CT ABD & PELVIS W/CONTRAST: CPT

## 2019-11-17 PROCEDURE — 2580000300 HC RX 258: Performed by: NURSE PRACTITIONER

## 2019-11-17 PROCEDURE — 2500000200 HC RX 250 WO HCPCS: Performed by: NURSE PRACTITIONER

## 2019-11-17 PROCEDURE — 96361 HYDRATE IV INFUSION ADD-ON: CPT

## 2019-11-17 PROCEDURE — 96374 THER/PROPH/DIAG INJ IV PUSH: CPT

## 2019-11-17 PROCEDURE — 96376 TX/PRO/DX INJ SAME DRUG ADON: CPT

## 2019-11-17 PROCEDURE — 6360000200 HC RX 636 W HCPCS (ALT 250 FOR IP): Performed by: NURSE PRACTITIONER

## 2019-11-17 PROCEDURE — 96375 TX/PRO/DX INJ NEW DRUG ADDON: CPT

## 2019-11-17 PROCEDURE — 36415 COLL VENOUS BLD VENIPUNCTURE: CPT | Performed by: NURSE PRACTITIONER

## 2019-11-17 PROCEDURE — 83690 ASSAY OF LIPASE: CPT | Performed by: NURSE PRACTITIONER

## 2019-11-17 PROCEDURE — 99284 EMERGENCY DEPT VISIT MOD MDM: CPT | Performed by: EMERGENCY MEDICINE

## 2019-11-17 PROCEDURE — 2550000100 HC RX 255: Performed by: NURSE PRACTITIONER

## 2019-11-17 PROCEDURE — 99284 EMERGENCY DEPT VISIT MOD MDM: CPT

## 2019-11-17 RX ORDER — ONDANSETRON HYDROCHLORIDE 2 MG/ML
4 INJECTION, SOLUTION INTRAVENOUS ONCE
Status: COMPLETED | OUTPATIENT
Start: 2019-11-17 | End: 2019-11-17

## 2019-11-17 RX ORDER — ONDANSETRON HCL 4 MG
1 TABLET ORAL ONCE
Status: DISCONTINUED | OUTPATIENT
Start: 2019-11-17 | End: 2019-11-17 | Stop reason: HOSPADM

## 2019-11-17 RX ORDER — MORPHINE SULFATE 4 MG/ML
4 INJECTION, SOLUTION INTRAMUSCULAR; INTRAVENOUS
Status: DISCONTINUED | OUTPATIENT
Start: 2019-11-17 | End: 2019-11-17 | Stop reason: HOSPADM

## 2019-11-17 RX ORDER — SODIUM CHLORIDE 9 MG/ML
1000 INJECTION, SOLUTION INTRAVENOUS ONCE
Status: COMPLETED | OUTPATIENT
Start: 2019-11-17 | End: 2019-11-17

## 2019-11-17 RX ORDER — IOPAMIDOL 755 MG/ML
140 INJECTION, SOLUTION INTRAVASCULAR ONCE
Status: COMPLETED | OUTPATIENT
Start: 2019-11-17 | End: 2019-11-17

## 2019-11-17 RX ADMIN — IOPAMIDOL 140 ML: 755 INJECTION, SOLUTION INTRAVENOUS at 16:55

## 2019-11-17 RX ADMIN — SODIUM CHLORIDE 1000 ML: 9 INJECTION, SOLUTION INTRAVENOUS at 16:10

## 2019-11-17 RX ADMIN — MORPHINE SULFATE 4 MG: 4 INJECTION, SOLUTION INTRAMUSCULAR; INTRAVENOUS at 17:43

## 2019-11-17 RX ADMIN — MORPHINE SULFATE 4 MG: 4 INJECTION, SOLUTION INTRAMUSCULAR; INTRAVENOUS at 16:11

## 2019-11-17 RX ADMIN — ONDANSETRON 4 MG: 2 INJECTION INTRAMUSCULAR; INTRAVENOUS at 16:10

## 2019-11-17 NOTE — ED PROVIDER NOTES
HPI:  Chief Complaint   Patient presents with   • Abdominal Pain     Patient states she has had symptoms since this morning.    • Nausea   • Vomiting   • Diarrhea     HPI  Patient presents with reports of nausea and vomiting, abdominal cramping, diarrhea and low back pain.  Patient states symptoms began early this morning.  States she woke this morning with vomiting.  Patient states she developed diarrhea earlier in the day.  Denies dysuria. Denies blood in stool or dark tarry stools.  States has had loose watery stools.  Reports aching across her low back.  Denies fever. Denies sick contacts.  States has been unable to keep anything down today, even sips of water.    HISTORY:  Past Medical History:   Diagnosis Date   • Acid reflux     takes prilosec   • Delivery by elective  section 2018   • Depression     situational-2yrs ago   • Gastrointestinal disease    • Iron deficiency 2019   • Morning headache 2019   • Obesity (BMI 30-39.9)    • Varicella        Past Surgical History:   Procedure Laterality Date   •  SECTION     •  SECTION, LOW TRANSVERSE N/A 2018    Procedure:  SECTION;  Surgeon: Naeem Miranda MD;  Location: [facility] L&D OR;  Service: Obstetrics;  Laterality: N/A;   • TONSILLECTOMY         Family History   Problem Relation Age of Onset   • Diabetes Mother 50         from complications   • Heart failure Mother    • Multiple sclerosis Mother    • Thyroid disease Mother    • No Known Problems Father    • ADD / ADHD Son        Social History     Tobacco Use   • Smoking status: Current Every Day Smoker     Packs/day: 0.20     Years: 15.00     Pack years: 3.00     Types: Cigarettes     Last attempt to quit: 2018     Years since quittin.8   • Smokeless tobacco: Never Used   • Tobacco comment: 1 cig/day   Substance Use Topics   • Alcohol use: Yes     Alcohol/week: 0.0 standard drinks   • Drug use: No       ROS:  Constitutional: negative for fever  HEENT:  negative for sore throat  Respiratory: negative for cough, negative for shortness of breath  Cardiovascular: negative for chest pain  GI: Positive for nausea and vomiting, positive for diarrhea, positive for abdominal cramping  : Negative for dysuria  Musculoskeletal: Positive for low back pain  Neurological: Negative for dizziness    PHYSICAL EXAM:  ED Triage Vitals   Temp Heart Rate Resp BP SpO2   11/17/19 1450 11/17/19 1450 11/17/19 1450 11/17/19 1536 11/17/19 1450   36.5 °C (97.7 °F) 111 18 121/82 99 %      Temp Source Heart Rate Source Patient Position BP Location FiO2 (%)   11/17/19 1450 -- 11/17/19 1536 -- --   Oral  Head of bed 30 degrees or higher       Nursing note and vitals reviewed.  Constitutional: appears well-developed.  Alert and interactive, nontoxic-appearing.  Head: Normocephalic and atraumatic.   Eyes: Conjunctiva normal.  PERRLA, EOMI.  Neck: Supple  Cardiovascular: Regular rate and rhythm  Pulmonary/Chest: No respiratory distress.  Clear to auscultation bilaterally.  Abdominal: Soft with diffuse tenderness to palpation, no guarding or rebound tenderness.  Normal bowel sounds.  Back: Non-tender.  Musculoskeletal: No edema  Neurological: Alert. No focal deficits.  Skin: Skin is warm and dry.  Psychiatric: Normal mood and affect.      Medications   morphine injection 4 mg (4 mg intravenous Given 11/17/19 1743)   ondansetron (ZOFRAN) tablet - ED DOSE PACK 1 Package (has no administration in time range)   sodium chloride 0.9 % bolus 1,000 mL (0 mL intravenous Stopped 11/17/19 1710)   ondansetron (ZOFRAN) injection 4 mg (4 mg intravenous Given 11/17/19 1610)   iopamidol (ISOVUE-370) 76 % injection 140 mL (140 mL intravenous Given 11/17/19 1655)       CT ABDOMEN PELVIS W IV CONTRAST NO ORAL CONTRAST   Final Result   Impression:   1.  Mild hepatic steatosis. Otherwise unremarkable postcontrast CT appearance of the upper abdomen.   2.  Negative pelvis.          ED Medication Administration from  11/17/2019 1448 to 11/17/2019 1752       Date/Time Order Dose Route Action Action by     11/17/2019 1610 sodium chloride 0.9 % bolus 1,000 mL 1,000 mL intravenous New Bag/New Syringe Nelson,      11/17/2019 1710 sodium chloride 0.9 % bolus 1,000 mL 0 mL intravenous Stopped VANNESSA Steve     11/17/2019 1610 ondansetron (ZOFRAN) injection 4 mg 4 mg intravenous Given Nelson,      11/17/2019 1611 morphine injection 4 mg 4 mg intravenous Given Nelson,      11/17/2019 1743 morphine injection 4 mg 4 mg intravenous Given VANNESSA Steve     11/17/2019 1655 iopamidol (ISOVUE-370) 76 % injection 140 mL 140 mL intravenous Given LARISSA Drake          Recent Results (from the past 24 hour(s))   Comprehensive metabolic panel Blood, Venous    Collection Time: 11/17/19  4:14 PM   Result Value Ref Range    Sodium 138 135 - 145 mmol/L    Potassium 3.9 3.5 - 5.1 mmol/L    Chloride 110 (H) 98 - 107 mmol/L    CO2 19 (L) 21 - 32 mmol/L    Anion Gap 9 3 - 11 mmol/L    BUN 16 7 - 25 mg/dL    Creatinine 0.56 (L) 0.60 - 1.20 mg/dL    Glucose 88 70 - 105 mg/dL    Calcium 9.1 8.6 - 10.3 mg/dL    AST 15 0 - 39 U/L    ALT (SGPT) 14 0 - 52 U/L    Alkaline Phosphatase 97 37 - 98 U/L    Total Protein 6.9 6.0 - 8.3 g/dL    Albumin 4.2 3.5 - 5.3 g/dL    Total Bilirubin 1.07 0.00 - 1.40 mg/dL    eGFR 123 >60 mL/min/1.73m*2    Corrected Calcium 8.9 8.6 - 10.3 mg/dL   Lipase Blood, Venous    Collection Time: 11/17/19  4:14 PM   Result Value Ref Range    Lipase 6 (L) 11 - 82 U/L   CBC w/auto differential Blood, Venous    Collection Time: 11/17/19  4:32 PM   Result Value Ref Range    WBC 10.1 4.5 - 10.5 10*3/uL    RBC 5.08 3.70 - 5.30 10*6/µL    Hemoglobin 15.1 11.5 - 15.5 g/dL    Hematocrit 44.1 34.0 - 45.0 %    MCV 86.9 81.0 - 97.0 fL    MCH 29.7 28.0 - 33.0 pg    MCHC 34.2 32.0 - 36.0 g/dL    RDW 13.7 11.5 - 14.0 %    Platelets 166 140 - 350 10*3/uL    MPV 9.3 6.9 - 10.8 fL    Neutrophils% 89 (H) 41 - 81 %    Lymphocytes% 5 (L) 11 - 47 %    Monocytes% 6 3 - 11 %     Eosinophils% 1 0 - 3 %    Basophils% 1 0 - 2 %    Neutrophils Absolute 8.90 10*3/uL    Lymphocytes Absolute 0.50 10*3/uL    Monocytes Absolute 0.60 10*3/uL    Eosinophils Absolute 0.10 10*3/uL    Basophils Absolute 0.10 10*3/uL   Urinalysis, microscopic Urine, Clean Catch    Collection Time: 11/17/19  5:05 PM   Result Value Ref Range    RBC, Urine 0-2 None seen, 0-2, Negative /HPF    WBC, Urine 0-4 0 - 4 /HPF    Squamous Epithelial, Urine 0-4 None Seen-9 /HPF    Bacteria, Urine Few None seen, Few /HPF   Urinalysis, dipstick Urine, Clean Catch    Collection Time: 11/17/19  5:05 PM   Result Value Ref Range    Color, Urine Yellow Yellow    Clarity, Urine Clear Clear    Specific Gravity, Urine >1.060 (H) 1.003 - 1.030    Leukocytes, Urine Negative Negative    Nitrite, Urine Negative Negative    Protein, Urine Negative Negative mg/dL    Ketones, Urine 20  (A) Negative mg/dL    Urobilinogen, Urine <2.0 <2.0 E.U./dL    Bilirubin, Urine Negative Negative    Blood, Urine Negative Negative    Glucose, Urine Negative Negative mg/dL    pH, Urine 5.0 5.0 - 8.0 PH       PROCEDURES:  Procedures    MDM: Patient presents with reports of nausea, vomiting and diarrhea, with abdominal cramping and low back pain.  States symptoms began earlier today, states woke with symptoms.  Reports unable to keep down even sips of fluids.  Denies fever.  Patient has diffuse abdominal tenderness on exam, no guarding or rebound tenderness, no localized tenderness.  Discussed patient case with Dr. Chase, attending ED physician, agrees with plan to obtain labs, treat patient with IV fluids, pain medication and antiemetics, and recommends CT abdomen and pelvis with IV contrast.    ED COURSE:  ED Course as of Nov 17 1752   Sun Nov 17, 2019   1715 Reviewed labs, CBC normal. Reviewed CMP, Crt mildly decreased, sodium and potassium normal, normal LFT's. Reviewed lipase 6.     [BB]   1723 Reviewed CT abdomen and pelvis shows fatty liver, no acute abnormality.   Reviewed urinalysis negative for infection.    [BB]   7266 Discussed results with patient.  Patient states nausea is improved, states still has some aching to low back.  Patient given medication for pain.  Discussed symptoms consistent with viral illness.  Recommend patient rest and increase oral fluids.  Prescribed Zofran to use as needed for nausea.  Provided work note.  Encouraged patient to follow-up with her primary care provider in 1 week as needed and to return or seek medical attention sooner as needed for new or worsening symptoms, patient agreeable with plan of care.  Dr. Chase, attending ED physician examined patient as well and agrees patient stable for discharge.    [BB]      ED Course User Index  [BB] Parul Childs CNP       CLINICAL IMPRESSION:  Final diagnoses:   [B34.9] Viral illness   [R11.2] Nausea and vomiting   [R10.9] Abdominal cramping   [M54.5] Low back pain   [R19.7] Diarrhea        Parul Childs CNP  11/17/19 5833

## 2019-11-18 NOTE — ED ATTESTATION NOTE
This is a 34-year-old female presenting to the emergency department with complaints of abdominal pain. The patient was seen by nurse practitioner Parul Kowalski. I agree with her assessment and treatment plan.  For my examination, the patient has moderate tenderness in the upper abdomen.  Work-up was found to be negative and the patient will be discharged home.      MD Umer LOUIS MD  11/17/19 8516

## 2019-11-18 NOTE — DISCHARGE INSTRUCTIONS
Recommend rest and increase oral fluids.  Dissolve Zofran 1 tablet under tongue every 8 hours as needed for nausea.  May use oral rehydration fluids such as Pedialyte or Gatorade.  Recommend clear liquids, advance as tolerated.  Follow-up with your primary care provider in 1 week.  Return or seek medical attention sooner as needed for new or worsening symptoms.

## 2019-12-03 ENCOUNTER — OFFICE VISIT (OUTPATIENT)
Dept: FAMILY MEDICINE | Facility: CLINIC | Age: 34
End: 2019-12-03
Payer: COMMERCIAL

## 2019-12-03 VITALS
HEIGHT: 64 IN | DIASTOLIC BLOOD PRESSURE: 78 MMHG | WEIGHT: 219 LBS | SYSTOLIC BLOOD PRESSURE: 122 MMHG | OXYGEN SATURATION: 99 % | TEMPERATURE: 98.1 F | BODY MASS INDEX: 37.39 KG/M2 | HEART RATE: 74 BPM

## 2019-12-03 DIAGNOSIS — F41.9 ANXIETY AND DEPRESSION: ICD-10-CM

## 2019-12-03 DIAGNOSIS — L92.0 GRANULOMA ANNULARE: Primary | ICD-10-CM

## 2019-12-03 DIAGNOSIS — F32.A ANXIETY AND DEPRESSION: ICD-10-CM

## 2019-12-03 PROCEDURE — 99213 OFFICE O/P EST LOW 20 MIN: CPT | Performed by: FAMILY MEDICINE

## 2019-12-03 RX ORDER — ONDANSETRON 4 MG/1
2 TABLET, ORALLY DISINTEGRATING ORAL EVERY 6 HOURS PRN
COMMUNITY
Start: 2019-11-17

## 2019-12-03 RX ORDER — FLUOXETINE HYDROCHLORIDE 20 MG/1
20 CAPSULE ORAL DAILY
Qty: 30 CAPSULE | Refills: 2 | Status: SHIPPED | OUTPATIENT
Start: 2019-12-03 | End: 2020-01-08 | Stop reason: SINTOL

## 2019-12-03 RX ORDER — OMEPRAZOLE 20 MG/1
20 CAPSULE, DELAYED RELEASE ORAL AS NEEDED
COMMUNITY

## 2019-12-03 ASSESSMENT — PAIN SCALES - GENERAL: PAINLEVEL: 0-NO PAIN

## 2019-12-04 PROBLEM — F41.9 ANXIETY AND DEPRESSION: Status: ACTIVE | Noted: 2019-12-04

## 2019-12-04 PROBLEM — F32.A ANXIETY AND DEPRESSION: Status: ACTIVE | Noted: 2019-12-04

## 2019-12-04 NOTE — PROGRESS NOTES
"SUBJECTIVE:    Chief Complaint   Patient presents with   • Depression   • Rash     left hand, gradually getting worse and spread to left ring finger         Marycruz Locke is a 34 y.o. old female who presents for follow up of the following problems:      Granuloma annulare  Her granuloma annulare is not improving with steroid ointment.  I will go ahead and refer her to dermatology for further evaluation and treatment.    Anxiety and depression  The patient has been having increasing mood issues.  She reports both anxiety and depression.  She has poor motivation, poor sleep.  She states she had similar symptoms in 2015 after her mother passed away.  She was on Prozac at that time and that did seem to help.  Recently her grandmother passed away and that seems to have triggered some of her current symptoms.  She does not use alcohol.  She has no suicidal thought or plan, but has felt at times it would be easier if she was not here.  Her  is aware of her current symptoms and is supportive.  She declines counseling, but we did discuss a few sessions through [facility].  I am starting her on Prozac 20 and would like her to follow-up in 6 weeks for recheck.         The patient's past medical history, medications,  allergies and social history were reviewed in her electronic medical record at today's visit.    Review of Systems -   Positive for: Rash on her left hand, anxiety, depression, poor sleep  Negative for: Suicidal ideation    OBJECTIVE:  /78 (BP Location: Right arm, Patient Position: Sitting, Cuff Size: Reg)   Pulse 74   Temp 36.7 °C (98.1 °F)   Ht 1.626 m (5' 4\")   Wt 99.3 kg (219 lb)   SpO2 99%   BMI 37.59 kg/m²   General appearance: alert, well appearing, and in no distress.  Skin exam -on her left hand, dorsal surface, she has an area of granuloma annulare that has now extended to her left ring finger.      DIAGNOSIS   Diagnosis Plan   1. Granuloma annulare  Ambulatory referral to Dermatology   2. " Anxiety and depression  FLUoxetine (PROzac) 20 mg capsule         PLAN:  See above for problem specific plan.    Return in about 6 weeks (around 1/14/2020) for 15 MIN FOLLOW UP VISIT; recheck mood..    Lora Yates MD

## 2019-12-04 NOTE — ASSESSMENT & PLAN NOTE
Her granuloma annulare is not improving with steroid ointment.  I will go ahead and refer her to dermatology for further evaluation and treatment.

## 2019-12-04 NOTE — ASSESSMENT & PLAN NOTE
The patient has been having increasing mood issues.  She reports both anxiety and depression.  She has poor motivation, poor sleep.  She states she had similar symptoms in 2015 after her mother passed away.  She was on Prozac at that time and that did seem to help.  Recently her grandmother passed away and that seems to have triggered some of her current symptoms.  She does not use alcohol.  She has no suicidal thought or plan, but has felt at times it would be easier if she was not here.  Her  is aware of her current symptoms and is supportive.  She declines counseling, but we did discuss a few sessions through [facility].  I am starting her on Prozac 20 and would like her to follow-up in 6 weeks for recheck.

## 2020-01-07 ENCOUNTER — PATIENT MESSAGE (OUTPATIENT)
Dept: FAMILY MEDICINE | Facility: CLINIC | Age: 35
End: 2020-01-07

## 2020-01-07 DIAGNOSIS — F41.9 ANXIETY AND DEPRESSION: Primary | ICD-10-CM

## 2020-01-07 DIAGNOSIS — F32.A ANXIETY AND DEPRESSION: Primary | ICD-10-CM

## 2020-01-07 NOTE — TELEPHONE ENCOUNTER
Pt has upcoming appointment on 1/15/20. Pt currently taking 1 cap (20mg) daily. Please advise. Thank you

## 2020-01-08 RX ORDER — BUPROPION HYDROCHLORIDE 150 MG/1
150 TABLET ORAL DAILY
Qty: 30 TABLET | Refills: 2 | Status: SHIPPED | OUTPATIENT
Start: 2020-01-08 | End: 2021-01-07

## 2020-05-19 ENCOUNTER — OFFICE VISIT (OUTPATIENT)
Dept: URGENT CARE | Facility: URGENT CARE | Age: 35
End: 2020-05-19
Payer: COMMERCIAL

## 2020-05-19 VITALS
SYSTOLIC BLOOD PRESSURE: 148 MMHG | HEART RATE: 82 BPM | BODY MASS INDEX: 39.09 KG/M2 | RESPIRATION RATE: 16 BRPM | WEIGHT: 229 LBS | DIASTOLIC BLOOD PRESSURE: 87 MMHG | OXYGEN SATURATION: 97 % | TEMPERATURE: 97.5 F | HEIGHT: 64 IN

## 2020-05-19 DIAGNOSIS — K60.2 FISSURE, ANORECTAL: Primary | ICD-10-CM

## 2020-05-19 PROCEDURE — 99213 OFFICE O/P EST LOW 20 MIN: CPT | Performed by: PHYSICIAN ASSISTANT

## 2020-05-19 RX ORDER — LIDOCAINE HYDROCHLORIDE 20 MG/ML
1 JELLY TOPICAL AS NEEDED
Qty: 30 ML | Refills: 0 | Status: SHIPPED | OUTPATIENT
Start: 2020-05-19 | End: 2020-06-18

## 2020-05-19 ASSESSMENT — PAIN SCALES - GENERAL: PAINLEVEL: 10-WORST PAIN EVER

## 2020-05-19 NOTE — PATIENT INSTRUCTIONS
Patient Education     Anal Fissure, Adult    An anal fissure is a small tear or crack in the tissue around the opening of the butt (anus). Bleeding from the tear or crack usually stops on its own within a few minutes. The bleeding may happen every time you poop (have a bowel movement) until the tear or crack heals.  What are the causes?  This condition is usually caused by passing a large or hard poop (stool). Other causes include:  · Trouble pooping (constipation).  · Passing watery poop (diarrhea).  · Inflammatory bowel disease (Crohn's disease or ulcerative colitis).  · Childbirth.  · Infections.  · Anal sex.  What are the signs or symptoms?  Symptoms of this condition include:  · Bleeding from the butt.  · Small amounts of blood on your poop. The blood coats the outside of the poop. It is not mixed with the poop.  · Small amounts of blood on the toilet paper or in the toilet after you poop.  · Pain when passing poop.  · Itching or irritation around the opening of the butt.  How is this diagnosed?  This condition may be diagnosed based on a physical exam. Your doctor may:  · Check your butt. A tear can often be seen by checking the area with care.  · Check your butt using a short tube (anoscope). The light in the tube will show any problems in your butt.  How is this treated?  Treatment for this condition may include:  · Treating problems that make it hard for you to pass poop. You may be told to:  ? Eat more fiber.  ? Drink more fluid.  ? Take fiber supplements.  ? Take medicines that make poop soft.  · Taking sitz baths. This may help to heal the tear.  · Using creams and ointments.  If your condition gets worse, other treatments may be needed such as:  · A shot near the tear or crack (botulinum injection).  · Surgery to repair the tear or crack.  Follow these instructions at home:  Eating and drinking    · Avoid bananas and dairy products. These foods can make it hard to poop.  · Drink enough fluid to keep  your pee (urine) pale yellow.  · Eat foods that have a lot of fiber in them, such as:  ? Beans.  ? Whole grains.  ? Fresh fruits.  ? Fresh vegetables.  General instructions    · Take over-the-counter and prescription medicines only as told by your doctor.  · Use creams or ointments only as told by your doctor.  · Keep the butt area as clean and dry as you can.  · Take a warm water bath (sitz bath) as told by your doctor. Do not use soap.  · Keep all follow-up visits as told by your doctor. This is important.  Contact a doctor if:  · You have more bleeding.  · You have a fever.  · You have watery poop that is mixed with blood.  · You have pain.  · Your problem gets worse, not better.  Summary  · An anal fissure is a small tear or crack in the skin around the opening of the butt (anus).  · This condition is usually caused by passing a large or hard poop (stool).  · Treatment includes treating the problems that make it hard for you to pass poop.  · Follow your doctor's instructions about caring for your condition at home.  · Keep all follow-up visits as told by your doctor. This is important.  This information is not intended to replace advice given to you by your health care provider. Make sure you discuss any questions you have with your health care provider.  Document Released: 08/15/2012 Document Revised: 05/30/2019 Document Reviewed: 05/30/2019  [employer] Interactive Patient Education © 2020 [employer]

## 2020-05-19 NOTE — PROGRESS NOTES
Clinic Note     SUBJECTIVE    Chief Complaint:  Chief Complaint   Patient presents with   • Rectum Pain     States she has always had a problem with her bowel movements, takes Miralax daily. Feels like her rectum may have torn yesterday and she did have bright red blood.       HPI:    Marycruz Locke is a 35 y.o. female who presents for Rectum Pain (States she has always had a problem with her bowel movements, takes Miralax daily. Feels like her rectum may have torn yesterday and she did have bright red blood.)  .     The following have been reviewed and updated as appropriate in this visit:         No Known Allergies  Current Outpatient Medications   Medication Sig Dispense Refill   • buPROPion XL (Wellbutrin XL) 150 mg 24 hr tablet Take 1 tablet (150 mg total) by mouth daily 30 tablet 2   • omeprazole (PriLOSEC) 20 mg capsule Take 20 mg by mouth as needed     • lidocaine (XYLOCAINE) 2 % jelly Apply 1 application topically as needed for pain scale 1-3/10 30 mL 0   • ondansetron ODT (ZOFRAN-ODT) 4 mg disintegrating tablet Take 2 mg by mouth every 6 (six) hours as needed       No current facility-administered medications for this visit.      Past Medical History:   Diagnosis Date   • Acid reflux     takes prilosec   • Anxiety    • Back pain    • Delivery by elective  section 2018   • Depression     situational-2yrs ago   • Gastrointestinal disease    • Granuloma annulare 12/3/2019   • Iron deficiency 2019   • Morning headache 2019   • Obesity (BMI 30-39.9)    • Varicella      Past Surgical History:   Procedure Laterality Date   •  SECTION     •  SECTION, LOW TRANSVERSE N/A 2018    Procedure:  SECTION;  Surgeon: Naeem Miranda MD;  Location: [facility] L[facility] OR;  Service: Obstetrics;  Laterality: N/A;   • TONSILLECTOMY       Family History   Problem Relation Age of Onset   • Diabetes Mother 50         from complications   • Heart failure Mother    • Multiple sclerosis  "Mother    • Thyroid disease Mother    • No Known Problems Father    • ADD / ADHD Son      Social History     Occupational History   • Occupation: lab tech   Tobacco Use   • Smoking status: Current Every Day Smoker     Packs/day: 0.20     Years: 15.00     Pack years: 3.00     Types: Cigarettes     Last attempt to quit: 2018     Years since quittin.3   • Smokeless tobacco: Never Used   • Tobacco comment: 1 cig/day   Substance and Sexual Activity   • Alcohol use: Yes     Alcohol/week: 0.0 standard drinks   • Drug use: No   • Sexual activity: Yes     Partners: Male     Birth control/protection: Injection   Social History Narrative   • Not on file       Review of Systems:  Review of Systems   As per HPI    OBJECTIVE     Vitals:  /87 (BP Location: Left arm, Patient Position: Sitting, Cuff Size: Large Long Adult)   Pulse 82   Temp 36.4 °C (97.5 °F) (Temporal)   Resp 16   Ht 1.626 m (5' 4\")   Wt 103.9 kg (229 lb)   SpO2 97%   BMI 39.31 kg/m²     PE:  Physical Exam  Vitals signs and nursing note reviewed.   Constitutional:       General: She is in acute distress.      Appearance: Normal appearance. She is well-developed. She is not ill-appearing.   HENT:      Head: Normocephalic and atraumatic.   Eyes:      Pupils: Pupils are equal, round, and reactive to light.   Cardiovascular:      Rate and Rhythm: Normal rate and regular rhythm.   Pulmonary:      Effort: Pulmonary effort is normal.   Genitourinary:     Exam position: Knee-chest position.      Rectum: Tenderness, anal fissure (noted externally and anteriorly) and external hemorrhoid present. No mass. Normal anal tone.   Skin:     General: Skin is warm and dry.   Neurological:      Mental Status: She is alert.           No results found for this or any previous visit (from the past 12 hour(s)).      ASSESSMENT:    Marycruz was seen today for rectum pain.    Diagnoses and all orders for this visit:    Fissure, anorectal  -     lidocaine (XYLOCAINE) 2 % " jelly; Apply 1 application topically as needed for pain scale 1-3/10          PLAN  Discussed with patient that she does have a external third that appeared of a previous hemorrhoid anteriorly on her rectum.  The internal exam of the rectum was slightly tender anteriorly but no other directions.  She did not have any current bleeding.  We will have her use lidocaine jelly topically for pain so she can have a bowel movement.  Do sitz baths 3-4 times a day for 10 to 15 minutes each time in warm water.  Continue the MiraLAX and adding Benefiber.  Recommended she talk her PCP or OB/GYN about her inability to have a bowel movement without applying external pressure around the rectum/perineum.  If she has continued problems, concerns, or worsening of symptoms she should recheck here or with her PCP.    Patient verbalizes understanding of above information will contact [facility] with further questions or concerns.    BOY Flood

## 2020-07-30 ENCOUNTER — APPOINTMENT (OUTPATIENT)
Dept: LAB | Facility: URGENT CARE | Age: 35
End: 2020-07-30
Payer: COMMERCIAL

## 2020-07-30 ENCOUNTER — NURSE TRIAGE (OUTPATIENT)
Dept: FAMILY MEDICINE | Facility: CLINIC | Age: 35
End: 2020-07-30

## 2020-07-30 DIAGNOSIS — Z20.828 EXPOSURE TO SARS-ASSOCIATED CORONAVIRUS: Primary | ICD-10-CM

## 2020-07-30 DIAGNOSIS — Z20.828 EXPOSURE TO SARS-ASSOCIATED CORONAVIRUS: ICD-10-CM

## 2020-07-30 LAB — SARS-COV-2 RNA RESP QL NAA+PROBE: NEGATIVE

## 2020-07-30 PROCEDURE — 87635 SARS-COV-2 COVID-19 AMP PRB: CPT | Performed by: FAMILY MEDICINE

## 2020-07-30 PROCEDURE — 99211 OFF/OP EST MAY X REQ PHY/QHP: CPT | Mod: CS | Performed by: FAMILY MEDICINE

## 2020-07-30 NOTE — TELEPHONE ENCOUNTER
Reason for Disposition  • COVID-19 symptoms per CDC guidelines AND NO risk factors.    Protocols used: COVID-19 EXPOSURE SCREENING [facility]

## 2020-07-30 NOTE — TELEPHONE ENCOUNTER
COVID-19 SCREENING ASSESSMENT    CRITERIA FOR TESTING: Yes to Any Symptoms     SYMPTOM QUESTIONS  Are you experiencing a cough, fever, shortness of breath, chills, repeated shaking with chills, muscle pain, headache, sore throat, nausea, vomiting, diarrhea, congestion, runny nose, fatigue or new loss of taste or smell? 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   Are you a ? No  Are you aged 65 or older? No  Are you currently pregnant? No  Are you a resident of a health care or long term care facility? No  Do you have chronic comorbidities that include: Yes--hypothyroidism.     • Pulmonary disease (asthma, COPD, pulmonary fibrosis, restrictive lung disease or any other chronic lung disease)  • Cardiac disease (CHF, hypertension, rhythm disorder, valvular disorder, or any other chronic heart disease)  • Immunosuppressed state (cancer treatment, long term corticosteroids, immunodeficiency, history of organ transplant, on other medications that cause immunosuppression)  • Other chronic illnesses (diabetes, chronic kidney disease, on dialysis, chronic liver disease)  Have you had close contact with a lab confirmed case of coronavirus (COVID-19) in the last 14 days? no  Details on close contact: n/a

## 2020-07-30 NOTE — LETTER
[facility]  [address] [id number] [phone]  Dept: [phone]  July 30, 2020       Marycruz Locke  [address] [place] [address]        Patient: Marycruz Locke  YOB: 1985  Today's Date: 07/30/20         To Whom it May Concern:    Marycruz Locke was evaluated through the [place] [address] [facility]. Marycruz should remain in isolation based on the following recommendations from the CDC and the [place] Department of Health:    · Must be 10 days or more since onset of symptoms AND  · Must be fever free for 24 hours off of all fever-reducing medications AND  · Symptoms must be improving     Thank you,     [place] [address] [facility]

## 2020-08-02 ENCOUNTER — OFFICE VISIT (OUTPATIENT)
Dept: URGENT CARE | Facility: URGENT CARE | Age: 35
End: 2020-08-02
Payer: COMMERCIAL

## 2020-08-02 VITALS
TEMPERATURE: 98.3 F | SYSTOLIC BLOOD PRESSURE: 132 MMHG | DIASTOLIC BLOOD PRESSURE: 81 MMHG | HEART RATE: 85 BPM | RESPIRATION RATE: 18 BRPM | OXYGEN SATURATION: 98 % | WEIGHT: 231 LBS | HEIGHT: 64 IN | BODY MASS INDEX: 39.44 KG/M2

## 2020-08-02 DIAGNOSIS — H66.92 LEFT ACUTE OTITIS MEDIA: ICD-10-CM

## 2020-08-02 DIAGNOSIS — B97.89 VIRAL RESPIRATORY ILLNESS: Primary | ICD-10-CM

## 2020-08-02 DIAGNOSIS — J98.8 VIRAL RESPIRATORY ILLNESS: Primary | ICD-10-CM

## 2020-08-02 PROCEDURE — 99213 OFFICE O/P EST LOW 20 MIN: CPT | Performed by: NURSE PRACTITIONER

## 2020-08-02 RX ORDER — MEDROXYPROGESTERONE ACETATE 150 MG/ML
150 INJECTION, SUSPENSION INTRAMUSCULAR
COMMUNITY

## 2020-08-02 RX ORDER — VORTIOXETINE 10 MG/1
10 TABLET, FILM COATED ORAL DAILY
COMMUNITY

## 2020-08-02 RX ORDER — AMOXICILLIN AND CLAVULANATE POTASSIUM 875; 125 MG/1; MG/1
1 TABLET, FILM COATED ORAL 2 TIMES DAILY
Qty: 20 TABLET | Refills: 0 | Status: SHIPPED | OUTPATIENT
Start: 2020-08-02 | End: 2020-08-12

## 2020-08-02 ASSESSMENT — PAIN SCALES - GENERAL: PAINLEVEL: 8

## 2020-08-03 ENCOUNTER — HOSPITAL ENCOUNTER (EMERGENCY)
Facility: HOSPITAL | Age: 35
Discharge: HOME | End: 2020-08-04
Attending: EMERGENCY MEDICINE
Payer: COMMERCIAL

## 2020-08-03 ENCOUNTER — APPOINTMENT (OUTPATIENT)
Dept: RADIOLOGY | Facility: HOSPITAL | Age: 35
End: 2020-08-03
Payer: COMMERCIAL

## 2020-08-03 DIAGNOSIS — E87.8 HYPERCHLOREMIA: ICD-10-CM

## 2020-08-03 DIAGNOSIS — J06.9 URI (UPPER RESPIRATORY INFECTION): Primary | ICD-10-CM

## 2020-08-03 DIAGNOSIS — R03.0 ELEVATED BLOOD PRESSURE READING: ICD-10-CM

## 2020-08-03 DIAGNOSIS — E86.0 DEHYDRATION: ICD-10-CM

## 2020-08-03 DIAGNOSIS — R53.1 GENERALIZED WEAKNESS: ICD-10-CM

## 2020-08-03 DIAGNOSIS — R07.9 CHEST PAIN: ICD-10-CM

## 2020-08-03 LAB
ANION GAP SERPL CALC-SCNC: 9 MMOL/L (ref 3–11)
BASOPHILS # BLD AUTO: 0 10*3/UL
BASOPHILS NFR BLD AUTO: 1 % (ref 0–2)
BUN SERPL-MCNC: 14 MG/DL (ref 7–25)
CALCIUM SERPL-MCNC: 8.9 MG/DL (ref 8.6–10.3)
CHLORIDE SERPL-SCNC: 109 MMOL/L (ref 98–107)
CO2 SERPL-SCNC: 21 MMOL/L (ref 21–32)
CREAT SERPL-MCNC: 0.63 MG/DL (ref 0.6–1.1)
EOSINOPHIL # BLD AUTO: 0.3 10*3/UL
EOSINOPHIL NFR BLD AUTO: 4 % (ref 0–3)
ERYTHROCYTE [DISTWIDTH] IN BLOOD BY AUTOMATED COUNT: 13.8 % (ref 11.5–14)
FIBRIN D-DIMER (NG/ML) IN PLATELET POOR PLASMA: 278 NG/ML FEU
GFR SERPL CREATININE-BSD FRML MDRD: 118 ML/MIN/1.73M*2
GLUCOSE SERPL-MCNC: 85 MG/DL (ref 70–105)
HCT VFR BLD AUTO: 40.3 % (ref 34–45)
HGB BLD-MCNC: 14 G/DL (ref 11.5–15.5)
LYMPHOCYTES # BLD AUTO: 2.6 10*3/UL
LYMPHOCYTES NFR BLD AUTO: 34 % (ref 11–47)
MCH RBC QN AUTO: 30.5 PG (ref 28–33)
MCHC RBC AUTO-ENTMCNC: 34.7 G/DL (ref 32–36)
MCV RBC AUTO: 87.8 FL (ref 81–97)
MONOCYTES # BLD AUTO: 0.6 10*3/UL
MONOCYTES NFR BLD AUTO: 8 % (ref 3–11)
NEUTROPHILS # BLD AUTO: 4.1 10*3/UL
NEUTROPHILS NFR BLD AUTO: 54 % (ref 41–81)
PLATELET # BLD AUTO: 197 10*3/UL (ref 140–350)
PMV BLD AUTO: 9.5 FL (ref 6.9–10.8)
POTASSIUM SERPL-SCNC: 3.5 MMOL/L (ref 3.5–5.1)
RBC # BLD AUTO: 4.59 10*6/ΜL (ref 3.7–5.3)
SARS-COV-2 RNA RESP QL NAA+PROBE: NEGATIVE
SODIUM SERPL-SCNC: 139 MMOL/L (ref 135–145)
WBC # BLD AUTO: 7.6 10*3/UL (ref 4.5–10.5)

## 2020-08-03 PROCEDURE — 80048 BASIC METABOLIC PNL TOTAL CA: CPT | Performed by: EMERGENCY MEDICINE

## 2020-08-03 PROCEDURE — 6370000100 HC RX 637 (ALT 250 FOR IP): Performed by: EMERGENCY MEDICINE

## 2020-08-03 PROCEDURE — C9803 HOPD COVID-19 SPEC COLLECT: HCPCS | Mod: CS | Performed by: EMERGENCY MEDICINE

## 2020-08-03 PROCEDURE — 99284 EMERGENCY DEPT VISIT MOD MDM: CPT | Performed by: EMERGENCY MEDICINE

## 2020-08-03 PROCEDURE — 36415 COLL VENOUS BLD VENIPUNCTURE: CPT | Performed by: EMERGENCY MEDICINE

## 2020-08-03 PROCEDURE — 85379 FIBRIN DEGRADATION QUANT: CPT | Performed by: EMERGENCY MEDICINE

## 2020-08-03 PROCEDURE — 87635 SARS-COV-2 COVID-19 AMP PRB: CPT | Performed by: EMERGENCY MEDICINE

## 2020-08-03 PROCEDURE — 6360000200 HC RX 636 W HCPCS (ALT 250 FOR IP): Performed by: EMERGENCY MEDICINE

## 2020-08-03 PROCEDURE — 71045 X-RAY EXAM CHEST 1 VIEW: CPT

## 2020-08-03 PROCEDURE — 93005 ELECTROCARDIOGRAM TRACING: CPT

## 2020-08-03 PROCEDURE — 85025 COMPLETE CBC W/AUTO DIFF WBC: CPT | Performed by: EMERGENCY MEDICINE

## 2020-08-03 PROCEDURE — 2580000300 HC RX 258: Performed by: EMERGENCY MEDICINE

## 2020-08-03 RX ORDER — NAPROXEN 500 MG/1
500 TABLET ORAL 2 TIMES DAILY WITH MEALS
Qty: 10 TABLET | Refills: 0 | Status: SHIPPED | OUTPATIENT
Start: 2020-08-03 | End: 2021-08-03

## 2020-08-03 RX ORDER — IBUPROFEN 800 MG/1
800 TABLET ORAL ONCE
Status: COMPLETED | OUTPATIENT
Start: 2020-08-03 | End: 2020-08-03

## 2020-08-03 RX ORDER — ONDANSETRON HYDROCHLORIDE 2 MG/ML
4 INJECTION, SOLUTION INTRAVENOUS ONCE
Status: COMPLETED | OUTPATIENT
Start: 2020-08-03 | End: 2020-08-03

## 2020-08-03 RX ORDER — SODIUM CHLORIDE 9 MG/ML
1000 INJECTION, SOLUTION INTRAVENOUS ONCE
Status: COMPLETED | OUTPATIENT
Start: 2020-08-03 | End: 2020-08-03

## 2020-08-03 RX ADMIN — IBUPROFEN 800 MG: 800 TABLET, FILM COATED ORAL at 22:01

## 2020-08-03 RX ADMIN — SODIUM CHLORIDE 1000 ML: 9 INJECTION, SOLUTION INTRAVENOUS at 21:43

## 2020-08-03 RX ADMIN — ONDANSETRON 4 MG: 2 INJECTION INTRAMUSCULAR; INTRAVENOUS at 22:01

## 2020-08-03 ASSESSMENT — PAIN DESCRIPTION - DESCRIPTORS: DESCRIPTORS: ACHING

## 2020-08-03 NOTE — PROGRESS NOTES
"Urgent Care Visit Note  Subjective     Chief Complaint  Chief Complaint   Patient presents with   • generalized body aches     tested negative for COVID on 7/30/2020. body aches, feeling short of breath, chest congestion, hurts when she takes a deep breath. symptoms started on Thursday. Hit her hard on Thursday.        History of Present Illness  Marycruz A Chucky is a 35 y.o. female presenting for a 4-day history of bilateral ear pain, left greater than right, headache, sore throat, body aches, chest congestion and sensation that she cannot take a deep breath.  She had a negative COVID test on 7/30/2020.  She denies fever, new loss of taste/smell, chest pain, abdominal discomfort, dizziness, concern for dehydration, or signs of respiratory distress. Self-cares have included over-the-counter products with minimal relief of symptoms; she did also use her son's inhaler which was helpful.  She does not have asthma.  She is a current smoker.    Review of Systems  Pertinent positives and negatives as per the HPI    Objective   Vital Signs  /81   Pulse 85   Temp 36.8 °C (98.3 °F) (Temporal)   Resp 18   Ht 1.626 m (5' 4\")   Wt 104.8 kg (231 lb)   SpO2 98%   BMI 39.65 kg/m²     Physical Exam  General Appearance:  Female in no acute distress   Eyes: Normal conjunctiva and eyelids.   ENT: No external lesions, scars, or masses. Bilateral external auditory canals clear.  Right TM injected without bulging.  Left TM injected and bulging. Inflamed nasal turbinates with clear nasal discharge present. Posterior oropharynx erythematous.  Cardiovascular: Regular rhythm and rate with no significant murmur. S1S2.   Respiratory: Clear to auscultation with no adventitious breath sounds appreciated. Respiratory pattern unlabored with no use of accessory muscles.   Skin: Warm and dry.  Heme/Lymph/Immun: No lymphadenopathy present in neck lymph node chains   Neurovascular:  Alert and oriented.  No focal " abnormalities.  Psychiatric: Normal mood, affect, and cognition.  Normal speech deonte.  Responsive, engaged in discussion.    Lab Review  No results found for this or any previous visit (from the past 2 hour(s)).    Assessment/Plan   Diagnoses and all orders for this visit:    Viral respiratory illness    Left acute otitis media  -     amoxicillin-pot clavulanate (AUGMENTIN) 875-125 mg per tablet; Take 1 tablet by mouth 2 (two) times a day for 10 days      Impression:  Patient history and physical exam are consistent with a viral respiratory tract infection with secondary left AOM.  And will be treated as above. Covid-19 testing was performed/negative. Etiology, self-cares, expected illness course, home isolation, and emergent care/need for follow-up were reviewed with patient as outlined below:    Plan/Patient Instructions (Adult):   · You are most likely experiencing a viral respiratory tract infection. Antibiotics are not effective against viruses.   · Viral respiratory infections are self-limiting, generally lasting 7-10 days in healthy adults.   ·  A post-infectious cough may linger up to one month after your infection clears.   · Supportive care for your illness includes: maintaining adequate hydration, drinking warm fluids, honey (.5-1 tsp) for cough, nasal saline, cool mist humidification, and rest.   · Acetaminophen or ibuprofen may be helpful for discomfort unless contraindicated by other medical diagnoses. Dextromethorphan may be used for cough.   · Decongestants such as Sudafed may offer mild relief of nasal congestion, but do use these if you have known high blood pressure or cardiovascular disease.  · If sinus pressure persists after seven days, consider adding in sinus irrigation and Flonase.  Flonase may be used for 2-4 weeks to reduce inflammation in the sinuses.  · Intranasal decongestants such as Afrin may be used for a maximum of 2-3 days.  · You may wish to avoid use of combination cold  remedies. If you choose these products, please be aware of their ingredients and avoid duplicate therapy.   · Please engage in good cough and hand-hygiene to prevent the spread of infection.   · If your Covid-19 testing is negative, you may return to work/the community when fever free for 24 hours without the use of fever reducing agents.  · Please follow-up with your primary care provider or Urgent Care should your symptoms worsen or not improve in the above-designated timeframe.   · Emergency:  Please seek emergent care if you experience trouble breathing, chest or severe abdominal pain, signs of dehydration (no urine for eight hours, sudden dizziness), confusion, severe or persistent vomiting, or other severe symptoms.    References:   Alayna et al., 2012   Mode & Hattie [UpToDate], 2017   CDC, 2020    Patient Education: Ready to learn, no apparent learning barriers were identified; learning preference includes listening.  Explained diagnosis and treatment plan; patient/caregiver expressed understanding of the content.    [place], RUBY, CNP, FNP-C  8/2/2020

## 2020-08-03 NOTE — PATIENT INSTRUCTIONS
Plan/Patient Instructions (Adult):   · You are most likely experiencing a viral respiratory tract infection. Antibiotics are not effective against viruses.   · Viral respiratory infections are self-limiting, generally lasting 7-10 days in healthy adults.   ·  A post-infectious cough may linger up to one month after your infection clears.   · Supportive care for your illness includes: maintaining adequate hydration, drinking warm fluids, honey (.5-1 tsp) for cough, nasal saline, cool mist humidification, and rest.   · Acetaminophen or ibuprofen may be helpful for discomfort unless contraindicated by other medical diagnoses. Dextromethorphan may be used for cough.   · Decongestants such as Sudafed may offer mild relief of nasal congestion, but do use these if you have known high blood pressure or cardiovascular disease.  · If sinus pressure persists after seven days, consider adding in sinus irrigation and Flonase.  Flonase may be used for 2-4 weeks to reduce inflammation in the sinuses.  · Intranasal decongestants such as Afrin may be used for a maximum of 2-3 days.  · You may wish to avoid use of combination cold remedies. If you choose these products, please be aware of their ingredients and avoid duplicate therapy.   · Please engage in good cough and hand-hygiene to prevent the spread of infection.   · If your Covid-19 testing is negative, you may return to work/the community when fever free for 24 hours without the use of fever reducing agents.  · Please follow-up with your primary care provider or Urgent Care should your symptoms worsen or not improve in the above-designated timeframe.   · Emergency:  Please seek emergent care if you experience trouble breathing, chest or severe abdominal pain, signs of dehydration (no urine for eight hours, sudden dizziness), confusion, severe or persistent vomiting, or other severe symptoms.    References:   Alayna et al., 2012   Mode & Hattie [UpToDate], 2017   CDC,  2020

## 2020-08-04 VITALS
TEMPERATURE: 97.7 F | DIASTOLIC BLOOD PRESSURE: 77 MMHG | HEIGHT: 64 IN | RESPIRATION RATE: 13 BRPM | WEIGHT: 230 LBS | HEART RATE: 69 BPM | SYSTOLIC BLOOD PRESSURE: 111 MMHG | BODY MASS INDEX: 39.27 KG/M2 | OXYGEN SATURATION: 96 %

## 2020-08-04 PROCEDURE — 96361 HYDRATE IV INFUSION ADD-ON: CPT

## 2020-08-04 PROCEDURE — 96374 THER/PROPH/DIAG INJ IV PUSH: CPT

## 2020-08-04 NOTE — ED PROVIDER NOTES
Chest Pain (pt stating chest pain x 3 days with shortness of breath.  covid test negative)        HPI:  This is a 35-year-old female presenting to the emergency department with complaints of multiple URI symptoms.    Patient states that 5 days ago she developed nasal congestion and a runny nose.  She feels achy all over and weak.  She has some heavy sharp stabbing chest pain.  Minimal coughing.  She feels very weak and tired.  No urinary symptoms.  No abdominal pain.  No significant headache.    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    Past Medical History:   Diagnosis Date   • Acid reflux     takes prilosec   • Anxiety    • Back pain    • Delivery by elective  section 2018   • Depression     situational-2yrs ago   • Disease of thyroid gland    • Gastrointestinal disease    • Granuloma annulare 12/3/2019   • Iron deficiency 2019   • Morning headache 2019   • Obesity (BMI 30-39.9)    • Varicella        Past Surgical History:   Procedure Laterality Date   •  SECTION     •  SECTION, LOW TRANSVERSE N/A 2018    Procedure:  SECTION;  Surgeon: Naeem Miranda MD;  Location: [facility] OR;  Service: Obstetrics;  Laterality: N/A;   • TONSILLECTOMY         Social History     Socioeconomic History   • Marital status: Single     Spouse name: Not on file   • Number of children: 2   • Years of education: Not on file   • Highest education level: Not on file   Occupational History   • Occupation: lab tech   Social Needs   • Financial resource strain: Not on file   • Food insecurity     Worry: Not on file     Inability: Not on file   • Transportation needs     Medical: Not on file     Non-medical: Not on file   Tobacco Use   • Smoking status: Current Every Day Smoker     Packs/day: 0.20     Years: 15.00     Pack years: 3.00     Types: Cigarettes     Last attempt to quit: 2018     Years since quittin.5   • Smokeless tobacco: Never Used    • Tobacco comment: 1 cig/day   Substance and Sexual Activity   • Alcohol use: Yes     Alcohol/week: 0.0 standard drinks   • Drug use: No   • Sexual activity: Yes     Partners: Male     Birth control/protection: Injection   Lifestyle   • Physical activity     Days per week: Not on file     Minutes per session: Not on file   • Stress: Not on file   Relationships   • Social connections     Talks on phone: Not on file     Gets together: Not on file     Attends [employer] service: Not on file     Active member of club or organization: Not on file     Attends meetings of clubs or organizations: Not on file     Relationship status: Not on file   • Intimate partner violence     Fear of current or ex partner: Not on file     Emotionally abused: Not on file     Physically abused: Not on file     Forced sexual activity: Not on file   Other Topics Concern   • Not on file   Social History Narrative   • Not on file       Family History   Problem Relation Age of Onset   • Diabetes Mother 50         from complications   • Heart failure Mother    • Multiple sclerosis Mother    • Thyroid disease Mother    • No Known Problems Father    • ADD / ADHD Son        No Known Allergies      Current Outpatient Medications:   •  esomeprazole (NexIUM) 40 mg capsule, Take 40 mg by mouth daily, Disp: , Rfl:   •  levothyroxine (SYNTHROID, LEVOTHROID) 50 mcg tablet, Take 50 mcg by mouth daily, Disp: , Rfl:   •  phentermine (ADIPEX-P) 37.5 mg tablet, Take 37.5 mg by mouth daily, Disp: , Rfl:   •  propranoloL (INDERAL) 10 mg tablet, , Disp: , Rfl:   •  medroxyPROGESTERone (Depo-Provera) 150 mg/mL injection, Inject 150 mg into the shoulder, thigh, or buttocks every 3 (three) months, Disp: , Rfl:   •  vortioxetine (Trintellix) 10 mg tablet, Take 10 mg by mouth daily, Disp: , Rfl:   •  amoxicillin-pot clavulanate (AUGMENTIN) 875-125 mg per tablet, Take 1 tablet by mouth 2 (two) times a day for 10 days, Disp: 20 tablet, Rfl: 0  •  buPROPion XL  (Wellbutrin XL) 150 mg 24 hr tablet, Take 1 tablet (150 mg total) by mouth daily (Patient not taking: Reported on 8/2/2020 ), Disp: 30 tablet, Rfl: 2  •  ondansetron ODT (ZOFRAN-ODT) 4 mg disintegrating tablet, Take 2 mg by mouth every 6 (six) hours as needed, Disp: , Rfl:   •  omeprazole (PriLOSEC) 20 mg capsule, Take 20 mg by mouth as needed, Disp: , Rfl:       ROS:  Constitutional: Positive for fever  Eyes: negative for visual changes  ENT: Positive for sore throat  Cardiovascular: Positive for chest pain  Respiratory: negative for shortness of breath   GI: negative for abdominal pain  : negative for hematuria  Musculoskeletal: negative for back pain  Neuro: negative for headache  Hematology: negative for bleeding  Immunology: negative for joint pain      ED Triage Vitals   Temp Heart Rate Resp BP SpO2   08/03/20 1613 08/03/20 1613 08/03/20 1613 08/03/20 1613 08/03/20 1613   36.5 °C (97.7 °F) 90 16 155/83 96 %      Temp Source Heart Rate Source Patient Position BP Location FiO2 (%)   08/03/20 1613 -- 08/03/20 1851 -- --   Oral  Sitting           Physical Exam:  Nursing note and vitals reviewed.  Constitutional: Nontoxic-appearing young female.  She answers questions appropriately.  She does look mildly dry uncomfortable  Head: Normocephalic and atraumatic. Mucous membranes are mildly dry  Eyes: Pupils are equal, round, and reactive to light.   Neck: Supple.  Cardiovascular: Regular rate and rhythm without murmur.   Pulmonary/Chest: No respiratory distress.  Clear to auscultation bilaterally.  Abdominal: Soft and nontender.    Back: No CVA tenderness. No midline tenderness.   Musculoskeletal: No edema  Neurological: Alert.   Skin: Skin is warm and dry. No rash noted.   Psychiatric: Normal mood and affect.        Labs:  Labs Reviewed   BASIC METABOLIC PANEL - Abnormal       Result Value    Sodium 139      Potassium 3.5      Chloride 109 (*)     CO2 21      BUN 14      Creatinine 0.63      Glucose 85      Calcium  8.9      Anion Gap 9      eGFR 118      Narrative:     Estimated GFR calculated using the 2009 CKD-EPI creatinine equation.   CBC WITH AUTO DIFFERENTIAL - Abnormal    WBC 7.6      RBC 4.59      Hemoglobin 14.0      Hematocrit 40.3      MCV 87.8      MCH 30.5      MCHC 34.7      RDW 13.8      Platelets 197      MPV 9.5      Neutrophils% 54      Lymphocytes% 34      Monocytes% 8      Eosinophils% 4 (*)     Basophils% 1      Neutrophils Absolute 4.10      Lymphocytes Absolute 2.60      Monocytes Absolute 0.60      Eosinophils Absolute 0.30      Basophils Absolute 0.00     COVID-19 MOLECULAR (PCR) - Normal    COVID-19 PCR Negative      Narrative:     COVID-19 NP sample collection can be used for Respiratory Pathogen Panel testing.  A negative result may not rule out COVID-19 in the patient, as the sensitivity of the test depends on the timing of the specimen collection, type of specimen, and the quality of the specimen. Result should be correlated with patient's history and clinical presentation.    Performance of this SARS-CoV-2 RNA test has only been established in individuals suspected of having COVID-19 by their healthcare provider.    Testing was performed using the Xpert Xpress SARS-CoV-2 RT-PCR assay (Risk Management Solution) on the GeneXpert Dx system.  Fact sheets for this Emergency Use Authorization (EUA) assay can be found at the following links:  For Healthcare Providers  https://www.fda.gov/media/768391/download  For Patients  https://www.fda.gov/media/205384/download   D-DIMER, QUANTITATIVE - Normal    D-Dimer, Quant (ng/mL) 278      Narrative:     D-dimer values < or =500 ng/mL FEU may be used in conjunction with clinical pretest probability to exclude deep vein thrombosis (DVT) and pulmonary embolism (PE).       Imaging:  XR chest portable 1 view   Final Result   IMPRESSION:   1.  No acute disease.          MDM:   This is a 35-year-old female presenting with multiple URI symptoms.  Patient be given a liter normal saline  along with Motrin and Tylenol.  Chest x-ray and baseline labs will be checked.  Covid 19  testing will be performed.  Patient will be observed.    Patient was observed for an extended period of time.  Chest x-ray was found to be negative.  Covid 19 test is negative.  D-dimer is negative.  Labs are without acute life-threatening abnormality.  Patient will be discharged home with medications for symptomatic relief.  Reasons to return the emergency department were discussed at length.  Patient verbalizes an understanding this and will now be discharged home.          Given   Medications   sodium chloride 0.9 % bolus 1,000 mL (0 mL intravenous Stopped 8/3/20 2243)   ibuprofen (ADVIL,MOTRIN) tablet 800 mg (800 mg oral Given 8/3/20 2201)   ondansetron (ZOFRAN) injection 4 mg (4 mg intravenous Given 8/3/20 2201)         Procedure    ECG 12 lead, Chest Pain    Date/Time: 8/3/2020 11:34 PM  Performed by: Umer Chase MD  Authorized by: Umer Chase MD     Comments:      His rhythm with a rate of 93.  Normal axis.  Normal intervals.  Good R wave progression.  No acute ischemic changes.  No ectopy.          Clinical Impression:    Final diagnoses:   [J06.9] URI (upper respiratory infection)   [R03.0] Elevated blood pressure reading   [R53.1] Generalized weakness   [E86.0] Dehydration   [E87.8] Hyperchloremia   [R07.9] Chest pain         I, Umer Chase MD, personally performed the services described in this documentation as transcribed by an emergency department scribe, in my presence, and it is both accurate and complete.      Umer Chase MD  08/03/20 [id number]

## 2020-08-04 NOTE — DISCHARGE INSTRUCTIONS
Take your medicines as prescribed.  Followup with your primary care physician.  Return to this emergency department if any symptoms worsen before then.

## 2020-09-28 ENCOUNTER — APPOINTMENT (OUTPATIENT)
Dept: LAB | Facility: URGENT CARE | Age: 35
End: 2020-09-28
Payer: COMMERCIAL

## 2020-09-28 DIAGNOSIS — J02.9 SORE THROAT: ICD-10-CM

## 2020-09-28 DIAGNOSIS — R51.9 INTRACTABLE HEADACHE, UNSPECIFIED CHRONICITY PATTERN, UNSPECIFIED HEADACHE TYPE: Primary | ICD-10-CM

## 2020-09-28 DIAGNOSIS — R53.83 FATIGUE, UNSPECIFIED TYPE: ICD-10-CM

## 2020-09-28 LAB — SARS-COV-2 RNA RESP QL NAA+PROBE: NEGATIVE

## 2020-09-28 PROCEDURE — 99211 OFF/OP EST MAY X REQ PHY/QHP: CPT | Mod: CS,LAB | Performed by: FAMILY MEDICINE

## 2020-09-28 PROCEDURE — 87635 SARS-COV-2 COVID-19 AMP PRB: CPT | Performed by: FAMILY MEDICINE

## 2020-11-12 ENCOUNTER — NURSE TRIAGE (OUTPATIENT)
Dept: FAMILY MEDICINE | Facility: CLINIC | Age: 35
End: 2020-11-12

## 2020-11-12 NOTE — TELEPHONE ENCOUNTER
COVID-19 SCREENING ASSESSMENT     CRITERIA FOR TESTING  Yes to Any Symptoms or Asymptomatic Testing With Approved Criteria/MH Agreement  What symptoms are you experiencing? 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 No  Are you a dialysis or current cancer patient? No  Are you currently pregnant? No  Do you work in an educational setting? No     OTHER QUESTIONS  Are you a [facility] employee? No  Have you had close contact with a lab confirmed case of COVID-19 in the last 14 days? yes  Details on close contact: coworker    Reason for Disposition  • Patient has COVID-19 symptoms per CDC guidelines.    Protocols used: COVID-19 TRIAGE PROTOCOL MONUMENT HEALTH

## 2020-11-14 ENCOUNTER — TELEPHONE (OUTPATIENT)
Dept: FAMILY MEDICINE | Facility: CLINIC | Age: 35
End: 2020-11-14

## 2020-11-15 ENCOUNTER — APPOINTMENT (OUTPATIENT)
Dept: LAB | Facility: URGENT CARE | Age: 35
End: 2020-11-15
Payer: COMMERCIAL

## 2020-11-15 DIAGNOSIS — Z11.52 ENCOUNTER FOR SCREENING LABORATORY TESTING FOR COVID-19 VIRUS: ICD-10-CM

## 2020-11-15 LAB — SARS-COV-2 RNA RESP QL NAA+PROBE: NEGATIVE

## 2020-11-15 PROCEDURE — 87635 SARS-COV-2 COVID-19 AMP PRB: CPT | Performed by: FAMILY MEDICINE

## 2020-11-15 PROCEDURE — 99211 OFF/OP EST MAY X REQ PHY/QHP: CPT | Mod: CS,LAB | Performed by: FAMILY MEDICINE

## 2022-10-21 ENCOUNTER — APPOINTMENT (RX ONLY)
Dept: URBAN - METROPOLITAN AREA CLINIC 158 | Facility: CLINIC | Age: 37
Setting detail: DERMATOLOGY
End: 2022-10-21

## 2022-10-21 VITALS — HEIGHT: 64 IN | WEIGHT: 240 LBS

## 2022-10-21 DIAGNOSIS — L92.0 GRANULOMA ANNULARE: ICD-10-CM

## 2022-10-21 DIAGNOSIS — Z79.899 OTHER LONG TERM (CURRENT) DRUG THERAPY: ICD-10-CM

## 2022-10-21 PROCEDURE — ? VENIPUNCTURE

## 2022-10-21 PROCEDURE — ? ORDER TESTS

## 2022-10-21 PROCEDURE — ? PRESCRIPTION

## 2022-10-21 PROCEDURE — 99204 OFFICE O/P NEW MOD 45 MIN: CPT

## 2022-10-21 PROCEDURE — 36415 COLL VENOUS BLD VENIPUNCTURE: CPT

## 2022-10-21 PROCEDURE — ? TREATMENT REGIMEN

## 2022-10-21 PROCEDURE — ? COUNSELING

## 2022-10-21 RX ORDER — BETAMETHASONE DIPROPIONATE 0.5 MG/G
APPLY CREAM, AUGMENTED TOPICAL BID
Qty: 50 | Refills: 3 | Status: ERX | COMMUNITY
Start: 2022-10-21

## 2022-10-21 RX ORDER — DAPSONE 25 MG/1
1 TAB TABLET ORAL DAILY
Qty: 60 | Refills: 2 | Status: ERX | COMMUNITY
Start: 2022-10-21

## 2022-10-21 RX ADMIN — DAPSONE 1 TAB: 25 TABLET ORAL at 00:00

## 2022-10-21 RX ADMIN — BETAMETHASONE DIPROPIONATE APPLY: 0.5 CREAM, AUGMENTED TOPICAL at 00:00

## 2022-10-21 ASSESSMENT — LOCATION ZONE DERM
LOCATION ZONE: ARM
LOCATION ZONE: HAND

## 2022-10-21 ASSESSMENT — LOCATION DETAILED DESCRIPTION DERM
LOCATION DETAILED: RIGHT ULNAR DORSAL HAND
LOCATION DETAILED: LEFT ANTECUBITAL SKIN
LOCATION DETAILED: LEFT RADIAL DORSAL HAND

## 2022-10-21 ASSESSMENT — LOCATION SIMPLE DESCRIPTION DERM
LOCATION SIMPLE: LEFT UPPER ARM
LOCATION SIMPLE: RIGHT HAND
LOCATION SIMPLE: LEFT HAND

## 2022-10-21 NOTE — PROCEDURE: HIGH RISK MEDICATION MONITORING
Tetracycline Pregnancy And Lactation Text: This medication is Pregnancy Category D and not consider safe during pregnancy. It is also excreted in breast milk.
Cellcept Counseling:  I discussed with the patient the risks of mycophenolate mofetil including but not limited to infection/immunosuppression, GI upset, hypokalemia, hypercholesterolemia, bone marrow suppression, lymphoproliferative disorders, malignancy, GI ulceration/bleed/perforation, colitis, interstitial lung disease, kidney failure, progressive multifocal leukoencephalopathy, and birth defects.  The patient understands that monitoring is required including a baseline creatinine and regular CBC testing. In addition, patient must alert us immediately if symptoms of infection or other concerning signs are noted.
Taltz Pregnancy And Lactation Text: The risk during pregnancy and breastfeeding is uncertain with this medication.
Enbrel Pregnancy And Lactation Text: This medication is Pregnancy Category B and is considered safe during pregnancy. It is unknown if this medication is excreted in breast milk.
Imiquimod Counseling:  I discussed with the patient the risks of imiquimod including but not limited to erythema, scaling, itching, weeping, crusting, and pain.  Patient understands that the inflammatory response to imiquimod is variable from person to person and was educated regarded proper titration schedule.  If flu-like symptoms develop, patient knows to discontinue the medication and contact us.
5-Fu Pregnancy And Lactation Text: This medication is Pregnancy Category X and contraindicated in pregnancy and in women who may become pregnant. It is unknown if this medication is excreted in breast milk.
Ketoconazole Pregnancy And Lactation Text: This medication is Pregnancy Category C and it isn't know if it is safe during pregnancy. It is also excreted in breast milk and breast feeding isn't recommended.
Erivedge Pregnancy And Lactation Text: This medication is Pregnancy Category X and is absolutely contraindicated during pregnancy. It is unknown if it is excreted in breast milk.
Hydroxyzine Pregnancy And Lactation Text: This medication is not safe during pregnancy and should not be taken. It is also excreted in breast milk and breast feeding isn't recommended.
Arava Counseling:  Patient counseled regarding adverse effects of Arava including but not limited to nausea, vomiting, abnormalities in liver function tests. Patients may develop mouth sores, rash, diarrhea, and abnormalities in blood counts. The patient understands that monitoring is required including LFTs and blood counts.  There is a rare possibility of scarring of the liver and lung problems that can occur when taking methotrexate. Persistent nausea, loss of appetite, pale stools, dark urine, cough, and shortness of breath should be reported immediately. Patient advised to discontinue Arava treatment and consult with a physician prior to attempting conception. The patient will have to undergo a treatment to eliminate Arava from the body prior to conception.
Valtrex Pregnancy And Lactation Text: this medication is Pregnancy Category B and is considered safe during pregnancy. This medication is not directly found in breast milk but it's metabolite acyclovir is present.
Protopic Counseling: Patient may experience a mild burning sensation during topical application. Protopic is not approved in children less than 2 years of age. There have been case reports of hematologic and skin malignancies in patients using topical calcineurin inhibitors although causality is questionable.
Oxybutynin Counseling:  I discussed with the patient the risks of oxybutynin including but not limited to skin rash, drowsiness, dry mouth, difficulty urinating, and blurred vision.
Erythromycin Pregnancy And Lactation Text: This medication is Pregnancy Category B and is considered safe during pregnancy. It is also excreted in breast milk.
Cephalexin Pregnancy And Lactation Text: This medication is Pregnancy Category B and considered safe during pregnancy.  It is also excreted in breast milk but can be used safely for shorter doses.
Griseofulvin Pregnancy And Lactation Text: This medication is Pregnancy Category X and is known to cause serious birth defects. It is unknown if this medication is excreted in breast milk but breast feeding should be avoided.
Taltz Counseling: I discussed with the patient the risks of ixekizumab including but not limited to immunosuppression, serious infections, worsening of inflammatory bowel disease and drug reactions.  The patient understands that monitoring is required including a PPD at baseline and must alert us or the primary physician if symptoms of infection or other concerning signs are noted.
Doxepin Pregnancy And Lactation Text: This medication is Pregnancy Category C and it isn't known if it is safe during pregnancy. It is also excreted in breast milk and breast feeding isn't recommended.
Eucrisa Counseling: Patient may experience a mild burning sensation during topical application. Eucrisa is not approved in children less than 2 years of age.
Sski Pregnancy And Lactation Text: This medication is Pregnancy Category D and isn't considered safe during pregnancy. It is excreted in breast milk.
Gabapentin Counseling: I discussed with the patient the risks of gabapentin including but not limited to dizziness, somnolence, fatigue and ataxia.
Quinolones Pregnancy And Lactation Text: This medication is Pregnancy Category C and it isn't know if it is safe during pregnancy. It is also excreted in breast milk.
Metronidazole Counseling:  I discussed with the patient the risks of metronidazole including but not limited to seizures, nausea/vomiting, a metallic taste in the mouth, nausea/vomiting and severe allergy.
Skyrizi Counseling: I discussed with the patient the risks of risankizumab-rzaa including but not limited to immunosuppression, and serious infections.  The patient understands that monitoring is required including a PPD at baseline and must alert us or the primary physician if symptoms of infection or other concerning signs are noted.
Albendazole Counseling:  I discussed with the patient the risks of albendazole including but not limited to cytopenia, kidney damage, nausea/vomiting and severe allergy.  The patient understands that this medication is being used in an off-label manner.
High Dose Vitamin A Pregnancy And Lactation Text: High dose vitamin A therapy is contraindicated during pregnancy and breast feeding.
Griseofulvin Counseling:  I discussed with the patient the risks of griseofulvin including but not limited to photosensitivity, cytopenia, liver damage, nausea/vomiting and severe allergy.  The patient understands that this medication is best absorbed when taken with a fatty meal (e.g., ice cream or french fries).
Cimetidine Counseling:  I discussed with the patient the risks of Cimetidine including but not limited to gynecomastia, headache, diarrhea, nausea, drowsiness, arrhythmias, pancreatitis, skin rashes, psychosis, bone marrow suppression and kidney toxicity.
Bactrim Pregnancy And Lactation Text: This medication is Pregnancy Category D and is known to cause fetal risk.  It is also excreted in breast milk.
Ivermectin Counseling:  Patient instructed to take medication on an empty stomach with a full glass of water.  Patient informed of potential adverse effects including but not limited to nausea, diarrhea, dizziness, itching, and swelling of the extremities or lymph nodes.  The patient verbalized understanding of the proper use and possible adverse effects of ivermectin.  All of the patient's questions and concerns were addressed.
Ivermectin Pregnancy And Lactation Text: This medication is Pregnancy Category C and it isn't known if it is safe during pregnancy. It is also excreted in breast milk.
Tazorac Pregnancy And Lactation Text: This medication is not safe during pregnancy. It is unknown if this medication is excreted in breast milk.
Infliximab Counseling:  I discussed with the patient the risks of infliximab including but not limited to myelosuppression, immunosuppression, autoimmune hepatitis, demyelinating diseases, lymphoma, and serious infections.  The patient understands that monitoring is required including a PPD at baseline and must alert us or the primary physician if symptoms of infection or other concerning signs are noted.
Cimzia Counseling:  I discussed with the patient the risks of Cimzia including but not limited to immunosuppression, allergic reactions and infections.  The patient understands that monitoring is required including a PPD at baseline and must alert us or the primary physician if symptoms of infection or other concerning signs are noted.
Imiquimod Pregnancy And Lactation Text: This medication is Pregnancy Category C. It is unknown if this medication is excreted in breast milk.
Dupixent Pregnancy And Lactation Text: This medication likely crosses the placenta but the risk for the fetus is uncertain. This medication is excreted in breast milk.
Otezla Counseling: The side effects of Otezla were discussed with the patient, including but not limited to worsening or new depression, weight loss, diarrhea, nausea, upper respiratory tract infection, and headache. Patient instructed to call the office should any adverse effect occur.  The patient verbalized understanding of the proper use and possible adverse effects of Otezla.  All the patient's questions and concerns were addressed.
Enbrel Counseling:  I discussed with the patient the risks of etanercept including but not limited to myelosuppression, immunosuppression, autoimmune hepatitis, demyelinating diseases, lymphoma, and infections.  The patient understands that monitoring is required including a PPD at baseline and must alert us or the primary physician if symptoms of infection or other concerning signs are noted.
Oxybutynin Pregnancy And Lactation Text: This medication is Pregnancy Category B and is considered safe during pregnancy. It is unknown if it is excreted in breast milk.
Azathioprine Pregnancy And Lactation Text: This medication is Pregnancy Category D and isn't considered safe during pregnancy. It is unknown if this medication is excreted in breast milk.
SSKI Counseling:  I discussed with the patient the risks of SSKI including but not limited to thyroid abnormalities, metallic taste, GI upset, fever, headache, acne, arthralgias, paraesthesias, lymphadenopathy, easy bleeding, arrhythmias, and allergic reaction.
Dapsone Counseling: I discussed with the patient the risks of dapsone including but not limited to hemolytic anemia, agranulocytosis, rashes, methemoglobinemia, kidney failure, peripheral neuropathy, headaches, GI upset, and liver toxicity.  Patients who start dapsone require monitoring including baseline LFTs and weekly CBCs for the first month, then every month thereafter.  The patient verbalized understanding of the proper use and possible adverse effects of dapsone.  All of the patient's questions and concerns were addressed.
Hydroxychloroquine Counseling:  I discussed with the patient that a baseline ophthalmologic exam is needed at the start of therapy and every year thereafter while on therapy. A CBC may also be warranted for monitoring.  The side effects of this medication were discussed with the patient, including but not limited to agranulocytosis, aplastic anemia, seizures, rashes, retinopathy, and liver toxicity. Patient instructed to call the office should any adverse effect occur.  The patient verbalized understanding of the proper use and possible adverse effects of Plaquenil.  All the patient's questions and concerns were addressed.
Tetracycline Counseling: Patient counseled regarding possible photosensitivity and increased risk for sunburn.  Patient instructed to avoid sunlight, if possible.  When exposed to sunlight, patients should wear protective clothing, sunglasses, and sunscreen.  The patient was instructed to call the office immediately if the following severe adverse effects occur:  hearing changes, easy bruising/bleeding, severe headache, or vision changes.  The patient verbalized understanding of the proper use and possible adverse effects of tetracycline.  All of the patient's questions and concerns were addressed. Patient understands to avoid pregnancy while on therapy due to potential birth defects.
Clofazimine Counseling:  I discussed with the patient the risks of clofazimine including but not limited to skin and eye pigmentation, liver damage, nausea/vomiting, gastrointestinal bleeding and allergy.
Methotrexate Counseling:  Patient counseled regarding adverse effects of methotrexate including but not limited to nausea, vomiting, abnormalities in liver function tests. Patients may develop mouth sores, rash, diarrhea, and abnormalities in blood counts. The patient understands that monitoring is required including LFT's and blood counts.  There is a rare possibility of scarring of the liver and lung problems that can occur when taking methotrexate. Persistent nausea, loss of appetite, pale stools, dark urine, cough, and shortness of breath should be reported immediately. Patient advised to discontinue methotrexate treatment at least three months before attempting to become pregnant.  I discussed the need for folate supplements while taking methotrexate.  These supplements can decrease side effects during methotrexate treatment. The patient verbalized understanding of the proper use and possible adverse effects of methotrexate.  All of the patient's questions and concerns were addressed.
Rifampin Counseling: I discussed with the patient the risks of rifampin including but not limited to liver damage, kidney damage, red-orange body fluids, nausea/vomiting and severe allergy.
Doxycycline Counseling:  Patient counseled regarding possible photosensitivity and increased risk for sunburn.  Patient instructed to avoid sunlight, if possible.  When exposed to sunlight, patients should wear protective clothing, sunglasses, and sunscreen.  The patient was instructed to call the office immediately if the following severe adverse effects occur:  hearing changes, easy bruising/bleeding, severe headache, or vision changes.  The patient verbalized understanding of the proper use and possible adverse effects of doxycycline.  All of the patient's questions and concerns were addressed.
Xolair Counseling:  Patient informed of potential adverse effects including but not limited to fever, muscle aches, rash and allergic reactions.  The patient verbalized understanding of the proper use and possible adverse effects of Xolair.  All of the patient's questions and concerns were addressed.
Bactrim Counseling:  I discussed with the patient the risks of sulfa antibiotics including but not limited to GI upset, allergic reaction, drug rash, diarrhea, dizziness, photosensitivity, and yeast infections.  Rarely, more serious reactions can occur including but not limited to aplastic anemia, agranulocytosis, methemoglobinemia, blood dyscrasias, liver or kidney failure, lung infiltrates or desquamative/blistering drug rashes.
Ketoconazole Counseling:   Patient counseled regarding improving absorption with orange juice.  Adverse effects include but are not limited to breast enlargement, headache, diarrhea, nausea, upset stomach, liver function test abnormalities, taste disturbance, and stomach pain.  There is a rare possibility of liver failure that can occur when taking ketoconazole. The patient understands that monitoring of LFTs may be required, especially at baseline. The patient verbalized understanding of the proper use and possible adverse effects of ketoconazole.  All of the patient's questions and concerns were addressed.
Erivedge Counseling- I discussed with the patient the risks of Erivedge including but not limited to nausea, vomiting, diarrhea, constipation, weight loss, changes in the sense of taste, decreased appetite, muscle spasms, and hair loss.  The patient verbalized understanding of the proper use and possible adverse effects of Erivedge.  All of the patient's questions and concerns were addressed.
Topical Clindamycin Counseling: Patient counseled that this medication may cause skin irritation or allergic reactions.  In the event of skin irritation, the patient was advised to reduce the amount of the drug applied or use it less frequently.   The patient verbalized understanding of the proper use and possible adverse effects of clindamycin.  All of the patient's questions and concerns were addressed.
Fluconazole Counseling:  Patient counseled regarding adverse effects of fluconazole including but not limited to headache, diarrhea, nausea, upset stomach, liver function test abnormalities, taste disturbance, and stomach pain.  There is a rare possibility of liver failure that can occur when taking fluconazole.  The patient understands that monitoring of LFTs and kidney function test may be required, especially at baseline. The patient verbalized understanding of the proper use and possible adverse effects of fluconazole.  All of the patient's questions and concerns were addressed.
Solaraze Pregnancy And Lactation Text: This medication is Pregnancy Category B and is considered safe. There is some data to suggest avoiding during the third trimester. It is unknown if this medication is excreted in breast milk.
Cosentyx Counseling:  I discussed with the patient the risks of Cosentyx including but not limited to worsening of Crohn's disease, immunosuppression, allergic reactions and infections.  The patient understands that monitoring is required including a PPD at baseline and must alert us or the primary physician if symptoms of infection or other concerning signs are noted.
Cimetidine Pregnancy And Lactation Text: This medication is Pregnancy Category B and is considered safe during pregnancy. It is also excreted in breast milk and breast feeding isn't recommended.
Azithromycin Counseling:  I discussed with the patient the risks of azithromycin including but not limited to GI upset, allergic reaction, drug rash, diarrhea, and yeast infections.
Clindamycin Pregnancy And Lactation Text: This medication can be used in pregnancy if certain situations. Clindamycin is also present in breast milk.
Elidel Counseling: Patient may experience a mild burning sensation during topical application. Elidel is not approved in children less than 2 years of age. There have been case reports of hematologic and skin malignancies in patients using topical calcineurin inhibitors although causality is questionable.
Benzoyl Peroxide Pregnancy And Lactation Text: This medication is Pregnancy Category C. It is unknown if benzoyl peroxide is excreted in breast milk.
Include Pregnancy/Lactation Warning?: No
Ilumya Counseling: I discussed with the patient the risks of tildrakizumab including but not limited to immunosuppression, malignancy, posterior leukoencephalopathy syndrome, and serious infections.  The patient understands that monitoring is required including a PPD at baseline and must alert us or the primary physician if symptoms of infection or other concerning signs are noted.
Doxepin Counseling:  Patient advised that the medication is sedating and not to drive a car after taking this medication. Patient informed of potential adverse effects including but not limited to dry mouth, urinary retention, and blurry vision.  The patient verbalized understanding of the proper use and possible adverse effects of doxepin.  All of the patient's questions and concerns were addressed.
Simponi Counseling:  I discussed with the patient the risks of golimumab including but not limited to myelosuppression, immunosuppression, autoimmune hepatitis, demyelinating diseases, lymphoma, and serious infections.  The patient understands that monitoring is required including a PPD at baseline and must alert us or the primary physician if symptoms of infection or other concerning signs are noted.
Bexarotene Pregnancy And Lactation Text: This medication is Pregnancy Category X and should not be given to women who are pregnant or may become pregnant. This medication should not be used if you are breast feeding.
Xelsandiz Pregnancy And Lactation Text: This medication is Pregnancy Category D and is not considered safe during pregnancy.  The risk during breast feeding is also uncertain.
Benzoyl Peroxide Counseling: Patient counseled that medicine may cause skin irritation and bleach clothing.  In the event of skin irritation, the patient was advised to reduce the amount of the drug applied or use it less frequently.   The patient verbalized understanding of the proper use and possible adverse effects of benzoyl peroxide.  All of the patient's questions and concerns were addressed.
Topical Retinoid counseling:  Patient advised to apply a pea-sized amount only at bedtime and wait 30 minutes after washing their face before applying.  If too drying, patient may add a non-comedogenic moisturizer. The patient verbalized understanding of the proper use and possible adverse effects of retinoids.  All of the patient's questions and concerns were addressed.
Spironolactone Counseling: Patient advised regarding risks of diarrhea, abdominal pain, hyperkalemia, birth defects (for female patients), liver toxicity and renal toxicity. The patient may need blood work to monitor liver and kidney function and potassium levels while on therapy. The patient verbalized understanding of the proper use and possible adverse effects of spironolactone.  All of the patient's questions and concerns were addressed.
High Dose Vitamin A Counseling: Side effects reviewed, pt to contact office should one occur.
Colchicine Pregnancy And Lactation Text: This medication is Pregnancy Category C and isn't considered safe during pregnancy. It is excreted in breast milk.
Glycopyrrolate Counseling:  I discussed with the patient the risks of glycopyrrolate including but not limited to skin rash, drowsiness, dry mouth, difficulty urinating, and blurred vision.
Azithromycin Pregnancy And Lactation Text: This medication is considered safe during pregnancy and is also secreted in breast milk.
Solaraze Counseling:  I discussed with the patient the risks of Solaraze including but not limited to erythema, scaling, itching, weeping, crusting, and pain.
Cyclosporine Counseling:  I discussed with the patient the risks of cyclosporine including but not limited to hypertension, gingival hyperplasia,myelosuppression, immunosuppression, liver damage, kidney damage, neurotoxicity, lymphoma, and serious infections. The patient understands that monitoring is required including baseline blood pressure, CBC, CMP, lipid panel and uric acid, and then 1-2 times monthly CMP and blood pressure.
Carac Counseling:  I discussed with the patient the risks of Carac including but not limited to erythema, scaling, itching, weeping, crusting, and pain.
Tremfya Counseling: I discussed with the patient the risks of guselkumab including but not limited to immunosuppression, serious infections, worsening of inflammatory bowel disease and drug reactions.  The patient understands that monitoring is required including a PPD at baseline and must alert us or the primary physician if symptoms of infection or other concerning signs are noted.
Zyclara Counseling:  I discussed with the patient the risks of imiquimod including but not limited to erythema, scaling, itching, weeping, crusting, and pain.  Patient understands that the inflammatory response to imiquimod is variable from person to person and was educated regarded proper titration schedule.  If flu-like symptoms develop, patient knows to discontinue the medication and contact us.
Hydroxychloroquine Pregnancy And Lactation Text: This medication has been shown to cause fetal harm but it isn't assigned a Pregnancy Risk Category. There are small amounts excreted in breast milk.
Cyclophosphamide Pregnancy And Lactation Text: This medication is Pregnancy Category D and it isn't considered safe during pregnancy. This medication is excreted in breast milk.
Siliq Counseling:  I discussed with the patient the risks of Siliq including but not limited to new or worsening depression, suicidal thoughts and behavior, immunosuppression, malignancy, posterior leukoencephalopathy syndrome, and serious infections.  The patient understands that monitoring is required including a PPD at baseline and must alert us or the primary physician if symptoms of infection or other concerning signs are noted. There is also a special program designed to monitor depression which is required with Siliq.
Hydroquinone Pregnancy And Lactation Text: This medication has not been assigned a Pregnancy Risk Category but animal studies failed to show danger with the topical medication. It is unknown if the medication is excreted in breast milk.
Stelara Counseling:  I discussed with the patient the risks of ustekinumab including but not limited to immunosuppression, malignancy, posterior leukoencephalopathy syndrome, and serious infections.  The patient understands that monitoring is required including a PPD at baseline and must alert us or the primary physician if symptoms of infection or other concerning signs are noted.
Glycopyrrolate Pregnancy And Lactation Text: This medication is Pregnancy Category B and is considered safe during pregnancy. It is unknown if it is excreted breast milk.
Drysol Pregnancy And Lactation Text: This medication is considered safe during pregnancy and breast feeding.
Isotretinoin Counseling: Patient should get monthly blood tests, not donate blood, not drive at night if vision affected, not share medication, and not undergo elective surgery for 6 months after tx completed. Side effects reviewed, pt to contact office should one occur.
Terbinafine Counseling: Patient counseling regarding adverse effects of terbinafine including but not limited to headache, diarrhea, rash, upset stomach, liver function test abnormalities, itching, taste/smell disturbance, nausea, abdominal pain, and flatulence.  There is a rare possibility of liver failure that can occur when taking terbinafine.  The patient understands that a baseline LFT and kidney function test may be required. The patient verbalized understanding of the proper use and possible adverse effects of terbinafine.  All of the patient's questions and concerns were addressed.
Rituxan Pregnancy And Lactation Text: This medication is Pregnancy Category C and it isn't know if it is safe during pregnancy. It is unknown if this medication is excreted in breast milk but similar antibodies are known to be excreted.
Detail Level: Detailed
Acitretin Pregnancy And Lactation Text: This medication is Pregnancy Category X and should not be given to women who are pregnant or may become pregnant in the future. This medication is excreted in breast milk.
Odomzo Counseling- I discussed with the patient the risks of Odomzo including but not limited to nausea, vomiting, diarrhea, constipation, weight loss, changes in the sense of taste, decreased appetite, muscle spasms, and hair loss.  The patient verbalized understanding of the proper use and possible adverse effects of Odomzo.  All of the patient's questions and concerns were addressed.
Picato Counseling:  I discussed with the patient the risks of Picato including but not limited to erythema, scaling, itching, weeping, crusting, and pain.
Protopic Pregnancy And Lactation Text: This medication is Pregnancy Category C. It is unknown if this medication is excreted in breast milk when applied topically.
Dupixent Counseling: I discussed with the patient the risks of dupilumab including but not limited to eye infection and irritation, cold sores, injection site reactions, worsening of asthma, allergic reactions and increased risk of parasitic infection.  Live vaccines should be avoided while taking dupilumab. Dupilumab will also interact with certain medications such as warfarin and cyclosporine. The patient understands that monitoring is required and they must alert us or the primary physician if symptoms of infection or other concerning signs are noted.
Tazorac Counseling:  Patient advised that medication is irritating and drying.  Patient may need to apply sparingly and wash off after an hour before eventually leaving it on overnight.  The patient verbalized understanding of the proper use and possible adverse effects of tazorac.  All of the patient's questions and concerns were addressed.
Nsaids Counseling: NSAID Counseling: I discussed with the patient that NSAIDs should be taken with food. Prolonged use of NSAIDs can result in the development of stomach ulcers.  Patient advised to stop taking NSAIDs if abdominal pain occurs.  The patient verbalized understanding of the proper use and possible adverse effects of NSAIDs.  All of the patient's questions and concerns were addressed.
Acitretin Counseling:  I discussed with the patient the risks of acitretin including but not limited to hair loss, dry lips/skin/eyes, liver damage, hyperlipidemia, depression/suicidal ideation, photosensitivity.  Serious rare side effects can include but are not limited to pancreatitis, pseudotumor cerebri, bony changes, clot formation/stroke/heart attack.  Patient understands that alcohol is contraindicated since it can result in liver toxicity and significantly prolong the elimination of the drug by many years.
Prednisone Pregnancy And Lactation Text: This medication is Pregnancy Category C and it isn't know if it is safe during pregnancy. This medication is excreted in breast milk.
Topical Sulfur Applications Counseling: Topical Sulfur Counseling: Patient counseled that this medication may cause skin irritation or allergic reactions.  In the event of skin irritation, the patient was advised to reduce the amount of the drug applied or use it less frequently.   The patient verbalized understanding of the proper use and possible adverse effects of topical sulfur application.  All of the patient's questions and concerns were addressed.
Topical Sulfur Applications Pregnancy And Lactation Text: This medication is Pregnancy Category C and has an unknown safety profile during pregnancy. It is unknown if this topical medication is excreted in breast milk.
Doxycycline Pregnancy And Lactation Text: This medication is Pregnancy Category D and not consider safe during pregnancy. It is also excreted in breast milk but is considered safe for shorter treatment courses.
Rifampin Pregnancy And Lactation Text: This medication is Pregnancy Category C and it isn't know if it is safe during pregnancy. It is also excreted in breast milk and should not be used if you are breast feeding.
Thalidomide Counseling: I discussed with the patient the risks of thalidomide including but not limited to birth defects, anxiety, weakness, chest pain, dizziness, cough and severe allergy.
5-Fu Counseling: 5-Fluorouracil Counseling:  I discussed with the patient the risks of 5-fluorouracil including but not limited to erythema, scaling, itching, weeping, crusting, and pain.
Nsaids Pregnancy And Lactation Text: These medications are considered safe up to 30 weeks gestation. It is excreted in breast milk.
Prednisone Counseling:  I discussed with the patient the risks of prolonged use of prednisone including but not limited to weight gain, insomnia, osteoporosis, mood changes, diabetes, susceptibility to infection, glaucoma and high blood pressure.  In cases where prednisone use is prolonged, patients should be monitored with blood pressure checks, serum glucose levels and an eye exam.  Additionally, the patient may need to be placed on GI prophylaxis, PCP prophylaxis, and calcium and vitamin D supplementation and/or a bisphosphonate.  The patient verbalized understanding of the proper use and the possible adverse effects of prednisone.  All of the patient's questions and concerns were addressed.
Cephalexin Counseling: I counseled the patient regarding use of cephalexin as an antibiotic for prophylactic and/or therapeutic purposes. Cephalexin (commonly prescribed under brand name Keflex) is a cephalosporin antibiotic which is active against numerous classes of bacteria, including most skin bacteria. Side effects may include nausea, diarrhea, gastrointestinal upset, rash, hives, yeast infections, and in rare cases, hepatitis, kidney disease, seizures, fever, confusion, neurologic symptoms, and others. Patients with severe allergies to penicillin medications are cautioned that there is about a 10% incidence of cross-reactivity with cephalosporins. When possible, patients with penicillin allergies should use alternatives to cephalosporins for antibiotic therapy.
Birth Control Pills Pregnancy And Lactation Text: This medication should be avoided if pregnant and for the first 30 days post-partum.
Isotretinoin Pregnancy And Lactation Text: This medication is Pregnancy Category X and is considered extremely dangerous during pregnancy. It is unknown if it is excreted in breast milk.
Erythromycin Counseling:  I discussed with the patient the risks of erythromycin including but not limited to GI upset, allergic reaction, drug rash, diarrhea, increase in liver enzymes, and yeast infections.
Hydroxyzine Counseling: Patient advised that the medication is sedating and not to drive a car after taking this medication.  Patient informed of potential adverse effects including but not limited to dry mouth, urinary retention, and blurry vision.  The patient verbalized understanding of the proper use and possible adverse effects of hydroxyzine.  All of the patient's questions and concerns were addressed.
Drysol Counseling:  I discussed with the patient the risks of drysol/aluminum chloride including but not limited to skin rash, itching, irritation, burning.
Methotrexate Pregnancy And Lactation Text: This medication is Pregnancy Category X and is known to cause fetal harm. This medication is excreted in breast milk.
Cimzia Pregnancy And Lactation Text: This medication crosses the placenta but can be considered safe in certain situations. Cimzia may be excreted in breast milk.
Sarecycline Counseling: Patient advised regarding possible photosensitivity and discoloration of the teeth, skin, lips, tongue and gums.  Patient instructed to avoid sunlight, if possible.  When exposed to sunlight, patients should wear protective clothing, sunglasses, and sunscreen.  The patient was instructed to call the office immediately if the following severe adverse effects occur:  hearing changes, easy bruising/bleeding, severe headache, or vision changes.  The patient verbalized understanding of the proper use and possible adverse effects of sarecycline.  All of the patient's questions and concerns were addressed.
Humira Counseling:  I discussed with the patient the risks of adalimumab including but not limited to myelosuppression, immunosuppression, autoimmune hepatitis, demyelinating diseases, lymphoma, and serious infections.  The patient understands that monitoring is required including a PPD at baseline and must alert us or the primary physician if symptoms of infection or other concerning signs are noted.
Dapsone Pregnancy And Lactation Text: This medication is Pregnancy Category C and is not considered safe during pregnancy or breast feeding.
Rituxan Counseling:  I discussed with the patient the risks of Rituxan infusions. Side effects can include infusion reactions, severe drug rashes including mucocutaneous reactions, reactivation of latent hepatitis and other infections and rarely progressive multifocal leukoencephalopathy.  All of the patient's questions and concerns were addressed.
Cyclophosphamide Counseling:  I discussed with the patient the risks of cyclophosphamide including but not limited to hair loss, hormonal abnormalities, decreased fertility, abdominal pain, diarrhea, nausea and vomiting, bone marrow suppression and infection. The patient understands that monitoring is required while taking this medication.
Xeljanz Counseling: I discussed with the patient the risks of Xeljanz therapy including increased risk of infection, liver issues, headache, diarrhea, or cold symptoms. Live vaccines should be avoided. They were instructed to call if they have any problems.
Valtrex Counseling: I discussed with the patient the risks of valacyclovir including but not limited to kidney damage, nausea, vomiting and severe allergy.  The patient understands that if the infection seems to be worsening or is not improving, they are to call.
Xolair Pregnancy And Lactation Text: This medication is Pregnancy Category B and is considered safe during pregnancy. This medication is excreted in breast milk.
Clindamycin Counseling: I counseled the patient regarding use of clindamycin as an antibiotic for prophylactic and/or therapeutic purposes. Clindamycin is active against numerous classes of bacteria, including skin bacteria. Side effects may include nausea, diarrhea, gastrointestinal upset, rash, hives, yeast infections, and in rare cases, colitis.
Quinolones Counseling:  I discussed with the patient the risks of fluoroquinolones including but not limited to GI upset, allergic reaction, drug rash, diarrhea, dizziness, photosensitivity, yeast infections, liver function test abnormalities, tendonitis/tendon rupture.
Spironolactone Pregnancy And Lactation Text: This medication can cause feminization of the male fetus and should be avoided during pregnancy. The active metabolite is also found in breast milk.
Metronidazole Pregnancy And Lactation Text: This medication is Pregnancy Category B and considered safe during pregnancy.  It is also excreted in breast milk.
Azathioprine Counseling:  I discussed with the patient the risks of azathioprine including but not limited to myelosuppression, immunosuppression, hepatotoxicity, lymphoma, and infections.  The patient understands that monitoring is required including baseline LFTs, Creatinine, possible TPMP genotyping and weekly CBCs for the first month and then every 2 weeks thereafter.  The patient verbalized understanding of the proper use and possible adverse effects of azathioprine.  All of the patient's questions and concerns were addressed.
Colchicine Counseling:  Patient counseled regarding adverse effects including but not limited to stomach upset (nausea, vomiting, stomach pain, or diarrhea).  Patient instructed to limit alcohol consumption while taking this medication.  Colchicine may reduce blood counts especially with prolonged use.  The patient understands that monitoring of kidney function and blood counts may be required, especially at baseline. The patient verbalized understanding of the proper use and possible adverse effects of colchicine.  All of the patient's questions and concerns were addressed.
Birth Control Pills Counseling: Birth Control Pill Counseling: I discussed with the patient the potential side effects of OCPs including but not limited to increased risk of stroke, heart attack, thrombophlebitis, deep venous thrombosis, hepatic adenomas, breast changes, GI upset, headaches, and depression.  The patient verbalized understanding of the proper use and possible adverse effects of OCPs. All of the patient's questions and concerns were addressed.
Hydroquinone Counseling:  Patient advised that medication may result in skin irritation, lightening (hypopigmentation), dryness, and burning.  In the event of skin irritation, the patient was advised to reduce the amount of the drug applied or use it less frequently.  Rarely, spots that are treated with hydroquinone can become darker (pseudoochronosis).  Should this occur, patient instructed to stop medication and call the office. The patient verbalized understanding of the proper use and possible adverse effects of hydroquinone.  All of the patient's questions and concerns were addressed.
Bexarotene Counseling:  I discussed with the patient the risks of bexarotene including but not limited to hair loss, dry lips/skin/eyes, liver abnormalities, hyperlipidemia, pancreatitis, depression/suicidal ideation, photosensitivity, drug rash/allergic reactions, hypothyroidism, anemia, leukopenia, infection, cataracts, and teratogenicity.  Patient understands that they will need regular blood tests to check lipid profile, liver function tests, white blood cell count, thyroid function tests and pregnancy test if applicable.
Itraconazole Counseling:  I discussed with the patient the risks of itraconazole including but not limited to liver damage, nausea/vomiting, neuropathy, and severe allergy.  The patient understands that this medication is best absorbed when taken with acidic beverages such as non-diet cola or ginger ale.  The patient understands that monitoring is required including baseline LFTs and repeat LFTs at intervals.  The patient understands that they are to contact us or the primary physician if concerning signs are noted.
Otezla Pregnancy And Lactation Text: This medication is Pregnancy Category C and it isn't known if it is safe during pregnancy. It is unknown if it is excreted in breast milk.
Minoxidil Counseling: Minoxidil is a topical medication which can increase blood flow where it is applied. It is uncertain how this medication increases hair growth. Side effects are uncommon and include stinging and allergic reactions.
Minocycline Counseling: Patient advised regarding possible photosensitivity and discoloration of the teeth, skin, lips, tongue and gums.  Patient instructed to avoid sunlight, if possible.  When exposed to sunlight, patients should wear protective clothing, sunglasses, and sunscreen.  The patient was instructed to call the office immediately if the following severe adverse effects occur:  hearing changes, easy bruising/bleeding, severe headache, or vision changes.  The patient verbalized understanding of the proper use and possible adverse effects of minocycline.  All of the patient's questions and concerns were addressed.

## 2022-10-21 NOTE — PROCEDURE: TREATMENT REGIMEN
Initiate Treatment: betamethasone, augmented 0.05 % topical cream BID\\ndapsone 50mg Daily
Initiate Treatment: Dapsone 50mg daily
Plan: Pt has been treated by another dermatologist for a couple years. She has used triamcinolone
Detail Level: Zone

## 2022-11-18 ENCOUNTER — APPOINTMENT (RX ONLY)
Dept: URBAN - METROPOLITAN AREA CLINIC 158 | Facility: CLINIC | Age: 37
Setting detail: DERMATOLOGY
End: 2022-11-18

## 2022-11-18 VITALS — WEIGHT: 238 LBS | HEIGHT: 64 IN

## 2022-11-18 DIAGNOSIS — L92.0 GRANULOMA ANNULARE: ICD-10-CM | Status: IMPROVED

## 2022-11-18 DIAGNOSIS — Z79.899 OTHER LONG TERM (CURRENT) DRUG THERAPY: ICD-10-CM

## 2022-11-18 PROCEDURE — ? PRESCRIPTION

## 2022-11-18 PROCEDURE — 36415 COLL VENOUS BLD VENIPUNCTURE: CPT

## 2022-11-18 PROCEDURE — ? TREATMENT REGIMEN

## 2022-11-18 PROCEDURE — 99214 OFFICE O/P EST MOD 30 MIN: CPT

## 2022-11-18 PROCEDURE — ? ORDER TESTS

## 2022-11-18 PROCEDURE — ? COUNSELING

## 2022-11-18 PROCEDURE — ? VENIPUNCTURE

## 2022-11-18 RX ORDER — BETAMETHASONE DIPROPIONATE 0.5 MG/G
APPLY CREAM, AUGMENTED TOPICAL BID
Qty: 50 | Refills: 3 | Status: ERX

## 2022-11-18 RX ORDER — DAPSONE 25 MG/1
2 TABLET ORAL DAILY
Qty: 60 | Refills: 2 | Status: ERX

## 2022-11-18 ASSESSMENT — LOCATION DETAILED DESCRIPTION DERM
LOCATION DETAILED: LEFT RADIAL DORSAL HAND
LOCATION DETAILED: RIGHT RADIAL DORSAL HAND
LOCATION DETAILED: LEFT ANTERIOR PROXIMAL UPPER ARM

## 2022-11-18 ASSESSMENT — LOCATION SIMPLE DESCRIPTION DERM
LOCATION SIMPLE: LEFT HAND
LOCATION SIMPLE: LEFT UPPER ARM
LOCATION SIMPLE: RIGHT HAND

## 2022-11-18 ASSESSMENT — LOCATION ZONE DERM
LOCATION ZONE: HAND
LOCATION ZONE: ARM

## 2022-11-18 NOTE — HPI: MEDICATION (DAPSONE)
Is This A New Presentation, Or A Follow-Up?: Follow Up Dapsone
How Severe Is It?: mild
How Many Months Of Dapsone Have You Completed?: 1

## 2022-11-18 NOTE — PROCEDURE: VENIPUNCTURE
Detail Level: None
Number Of Tubes Drawn: 1
Bill [id number] For Specimen Handling/Conveyance To Laboratory?: no
Venipuncture Paragraph: An alcohol pad was applied to the venipuncture site. Venipuncture was performed using a butterfly needle. Pressure and a bandaid was applied to the site. No complications were noted.

## 2022-11-18 NOTE — PROCEDURE: TREATMENT REGIMEN
Continue Regimen: Dapsone 50mg daily \\nBetamethasone 0.05% topical cream Bid
Continue Regimen: Dapsone 50mg daily
Plan: CBC drawn today
Detail Level: Zone

## 2022-11-18 NOTE — PROCEDURE: HIGH RISK MEDICATION MONITORING
Dapsone Pregnancy And Lactation Text: This medication is Pregnancy Category C and is not considered safe during pregnancy or breast feeding.
Opzelura Counseling:  I discussed with the patient the risks of Opzelura including but not limited to nasopharngitis, bronchitis, ear infection, eosinophila, hives, diarrhea, folliculitis, tonsillitis, and rhinorrhea.  Taken orally, this medication has been linked to serious infections; higher rate of mortality; malignancy and lymphoproliferative disorders; major adverse cardiovascular events; thrombosis; thrombocytopenia, anemia, and neutropenia; and lipid elevations.
Odomzo Counseling- I discussed with the patient the risks of Odomzo including but not limited to nausea, vomiting, diarrhea, constipation, weight loss, changes in the sense of taste, decreased appetite, muscle spasms, and hair loss.  The patient verbalized understanding of the proper use and possible adverse effects of Odomzo.  All of the patient's questions and concerns were addressed.
Sski Pregnancy And Lactation Text: This medication is Pregnancy Category D and isn't considered safe during pregnancy. It is excreted in breast milk.
Zyclara Counseling:  I discussed with the patient the risks of imiquimod including but not limited to erythema, scaling, itching, weeping, crusting, and pain.  Patient understands that the inflammatory response to imiquimod is variable from person to person and was educated regarded proper titration schedule.  If flu-like symptoms develop, patient knows to discontinue the medication and contact us.
Cellcept Counseling:  I discussed with the patient the risks of mycophenolate mofetil including but not limited to infection/immunosuppression, GI upset, hypokalemia, hypercholesterolemia, bone marrow suppression, lymphoproliferative disorders, malignancy, GI ulceration/bleed/perforation, colitis, interstitial lung disease, kidney failure, progressive multifocal leukoencephalopathy, and birth defects.  The patient understands that monitoring is required including a baseline creatinine and regular CBC testing. In addition, patient must alert us immediately if symptoms of infection or other concerning signs are noted.
Itraconazole Pregnancy And Lactation Text: This medication is Pregnancy Category C and it isn't know if it is safe during pregnancy. It is also excreted in breast milk.
Imiquimod Counseling:  I discussed with the patient the risks of imiquimod including but not limited to erythema, scaling, itching, weeping, crusting, and pain.  Patient understands that the inflammatory response to imiquimod is variable from person to person and was educated regarded proper titration schedule.  If flu-like symptoms develop, patient knows to discontinue the medication and contact us.
Glycopyrrolate Pregnancy And Lactation Text: This medication is Pregnancy Category B and is considered safe during pregnancy. It is unknown if it is excreted breast milk.
Erythromycin Counseling:  I discussed with the patient the risks of erythromycin including but not limited to GI upset, allergic reaction, drug rash, diarrhea, increase in liver enzymes, and yeast infections.
Libtayo Pregnancy And Lactation Text: This medication is contraindicated in pregnancy and when breast feeding.
Use Enhanced Medication Counseling?: No
Low Dose Naltrexone Counseling- I discussed with the patient the potential risks and side effects of low dose naltrexone including but not limited to: more vivid dreams, headaches, nausea, vomiting, abdominal pain, fatigue, dizziness, and anxiety.
Doxycycline Pregnancy And Lactation Text: This medication is Pregnancy Category D and not consider safe during pregnancy. It is also excreted in breast milk but is considered safe for shorter treatment courses.
Protopic Counseling: Patient may experience a mild burning sensation during topical application. Protopic is not approved in children less than 2 years of age. There have been case reports of hematologic and skin malignancies in patients using topical calcineurin inhibitors although causality is questionable.
SSKI Counseling:  I discussed with the patient the risks of SSKI including but not limited to thyroid abnormalities, metallic taste, GI upset, fever, headache, acne, arthralgias, paraesthesias, lymphadenopathy, easy bleeding, arrhythmias, and allergic reaction.
Doxepin Counseling:  Patient advised that the medication is sedating and not to drive a car after taking this medication. Patient informed of potential adverse effects including but not limited to dry mouth, urinary retention, and blurry vision.  The patient verbalized understanding of the proper use and possible adverse effects of doxepin.  All of the patient's questions and concerns were addressed.
Winlevi Counseling:  I discussed with the patient the risks of topical clascoterone including but not limited to erythema, scaling, itching, and stinging. Patient voiced their understanding.
Adbry Counseling: I discussed with the patient the risks of tralokinumab including but not limited to eye infection and irritation, cold sores, injection site reactions, worsening of asthma, allergic reactions and increased risk of parasitic infection.  Live vaccines should be avoided while taking tralokinumab. The patient understands that monitoring is required and they must alert us or the primary physician if symptoms of infection or other concerning signs are noted.
Spironolactone Pregnancy And Lactation Text: This medication can cause feminization of the male fetus and should be avoided during pregnancy. The active metabolite is also found in breast milk.
Doxepin Pregnancy And Lactation Text: This medication is Pregnancy Category C and it isn't known if it is safe during pregnancy. It is also excreted in breast milk and breast feeding isn't recommended.
Propranolol Counseling:  I discussed with the patient the risks of propranolol including but not limited to low heart rate, low blood pressure, low blood sugar, restlessness and increased cold sensitivity. They should call the office if they experience any of these side effects.
Colchicine Counseling:  Patient counseled regarding adverse effects including but not limited to stomach upset (nausea, vomiting, stomach pain, or diarrhea).  Patient instructed to limit alcohol consumption while taking this medication.  Colchicine may reduce blood counts especially with prolonged use.  The patient understands that monitoring of kidney function and blood counts may be required, especially at baseline. The patient verbalized understanding of the proper use and possible adverse effects of colchicine.  All of the patient's questions and concerns were addressed.
Terbinafine Counseling: Patient counseling regarding adverse effects of terbinafine including but not limited to headache, diarrhea, rash, upset stomach, liver function test abnormalities, itching, taste/smell disturbance, nausea, abdominal pain, and flatulence.  There is a rare possibility of liver failure that can occur when taking terbinafine.  The patient understands that a baseline LFT and kidney function test may be required. The patient verbalized understanding of the proper use and possible adverse effects of terbinafine.  All of the patient's questions and concerns were addressed.
Carac Pregnancy And Lactation Text: This medication is Pregnancy Category X and contraindicated in pregnancy and in women who may become pregnant. It is unknown if this medication is excreted in breast milk.
Bexarotene Pregnancy And Lactation Text: This medication is Pregnancy Category X and should not be given to women who are pregnant or may become pregnant. This medication should not be used if you are breast feeding.
Topical Ketoconazole Pregnancy And Lactation Text: This medication is Pregnancy Category B and is considered safe during pregnancy. It is unknown if it is excreted in breast milk.
Cephalexin Counseling: I counseled the patient regarding use of cephalexin as an antibiotic for prophylactic and/or therapeutic purposes. Cephalexin (commonly prescribed under brand name Keflex) is a cephalosporin antibiotic which is active against numerous classes of bacteria, including most skin bacteria. Side effects may include nausea, diarrhea, gastrointestinal upset, rash, hives, yeast infections, and in rare cases, hepatitis, kidney disease, seizures, fever, confusion, neurologic symptoms, and others. Patients with severe allergies to penicillin medications are cautioned that there is about a 10% incidence of cross-reactivity with cephalosporins. When possible, patients with penicillin allergies should use alternatives to cephalosporins for antibiotic therapy.
Rifampin Pregnancy And Lactation Text: This medication is Pregnancy Category C and it isn't know if it is safe during pregnancy. It is also excreted in breast milk and should not be used if you are breast feeding.
Valtrex Counseling: I discussed with the patient the risks of valacyclovir including but not limited to kidney damage, nausea, vomiting and severe allergy.  The patient understands that if the infection seems to be worsening or is not improving, they are to call.
Hydroquinone Counseling:  Patient advised that medication may result in skin irritation, lightening (hypopigmentation), dryness, and burning.  In the event of skin irritation, the patient was advised to reduce the amount of the drug applied or use it less frequently.  Rarely, spots that are treated with hydroquinone can become darker (pseudoochronosis).  Should this occur, patient instructed to stop medication and call the office. The patient verbalized understanding of the proper use and possible adverse effects of hydroquinone.  All of the patient's questions and concerns were addressed.
Minocycline Pregnancy And Lactation Text: This medication is Pregnancy Category D and not consider safe during pregnancy. It is also excreted in breast milk.
Benzoyl Peroxide Counseling: Patient counseled that medicine may cause skin irritation and bleach clothing.  In the event of skin irritation, the patient was advised to reduce the amount of the drug applied or use it less frequently.   The patient verbalized understanding of the proper use and possible adverse effects of benzoyl peroxide.  All of the patient's questions and concerns were addressed.
Drysol Pregnancy And Lactation Text: This medication is considered safe during pregnancy and breast feeding.
Topical Retinoid Pregnancy And Lactation Text: This medication is Pregnancy Category C. It is unknown if this medication is excreted in breast milk.
Protopic Pregnancy And Lactation Text: This medication is Pregnancy Category C. It is unknown if this medication is excreted in breast milk when applied topically.
Cosentyx Counseling:  I discussed with the patient the risks of Cosentyx including but not limited to worsening of Crohn's disease, immunosuppression, allergic reactions and infections.  The patient understands that monitoring is required including a PPD at baseline and must alert us or the primary physician if symptoms of infection or other concerning signs are noted.
Rhofade Pregnancy And Lactation Text: This medication has not been assigned a Pregnancy Risk Category. It is unknown if the medication is excreted in breast milk.
Low Dose Naltrexone Pregnancy And Lactation Text: Naltrexone is pregnancy category C.  There have been no adequate and well-controlled studies in pregnant women.  It should be used in pregnancy only if the potential benefit justifies the potential risk to the fetus.   Limited data indicates that naltrexone is minimally excreted into breastmilk.
Oral Minoxidil Pregnancy And Lactation Text: This medication should only be used when clearly needed if you are pregnant, attempting to become pregnant or breast feeding.
Nsaids Pregnancy And Lactation Text: These medications are considered safe up to 30 weeks gestation. It is excreted in breast milk.
Topical Ketoconazole Counseling: Patient counseled that this medication may cause skin irritation or allergic reactions.  In the event of skin irritation, the patient was advised to reduce the amount of the drug applied or use it less frequently.   The patient verbalized understanding of the proper use and possible adverse effects of ketoconazole.  All of the patient's questions and concerns were addressed.
Cibinqo Pregnancy And Lactation Text: It is unknown if this medication will adversely affect pregnancy or breast feeding.  You should not take this medication if you are currently pregnant or planning a pregnancy or while breastfeeding.
Stelara Pregnancy And Lactation Text: This medication is Pregnancy Category B and is considered safe during pregnancy. It is unknown if this medication is excreted in breast milk.
Enbrel Counseling:  I discussed with the patient the risks of etanercept including but not limited to myelosuppression, immunosuppression, autoimmune hepatitis, demyelinating diseases, lymphoma, and infections.  The patient understands that monitoring is required including a PPD at baseline and must alert us or the primary physician if symptoms of infection or other concerning signs are noted.
Rinvoq Counseling: I discussed with the patient the risks of Rinvoq therapy including but not limited to upper respiratory tract infections, shingles, cold sores, bronchitis, nausea, cough, fever, acne, and headache. Live vaccines should be avoided.  This medication has been linked to serious infections; higher rate of mortality; malignancy and lymphoproliferative disorders; major adverse cardiovascular events; thrombosis; thrombocytopenia, anemia, and neutropenia; lipid elevations; liver enzyme elevations; and gastrointestinal perforations.
Cimetidine Counseling:  I discussed with the patient the risks of Cimetidine including but not limited to gynecomastia, headache, diarrhea, nausea, drowsiness, arrhythmias, pancreatitis, skin rashes, psychosis, bone marrow suppression and kidney toxicity.
Rinvoq Pregnancy And Lactation Text: Based on animal studies, Rinvoq may cause embryo-fetal harm when administered to pregnant women.  The medication should not be used in pregnancy.  Breastfeeding is not recommended during treatment and for 6 days after the last dose.
Gabapentin Counseling: I discussed with the patient the risks of gabapentin including but not limited to dizziness, somnolence, fatigue and ataxia.
Solaraze Counseling:  I discussed with the patient the risks of Solaraze including but not limited to erythema, scaling, itching, weeping, crusting, and pain.
Humira Counseling:  I discussed with the patient the risks of adalimumab including but not limited to myelosuppression, immunosuppression, autoimmune hepatitis, demyelinating diseases, lymphoma, and serious infections.  The patient understands that monitoring is required including a PPD at baseline and must alert us or the primary physician if symptoms of infection or other concerning signs are noted.
Arava Counseling:  Patient counseled regarding adverse effects of Arava including but not limited to nausea, vomiting, abnormalities in liver function tests. Patients may develop mouth sores, rash, diarrhea, and abnormalities in blood counts. The patient understands that monitoring is required including LFTs and blood counts.  There is a rare possibility of scarring of the liver and lung problems that can occur when taking methotrexate. Persistent nausea, loss of appetite, pale stools, dark urine, cough, and shortness of breath should be reported immediately. Patient advised to discontinue Arava treatment and consult with a physician prior to attempting conception. The patient will have to undergo a treatment to eliminate Arava from the body prior to conception.
Terbinafine Pregnancy And Lactation Text: This medication is Pregnancy Category B and is considered safe during pregnancy. It is also excreted in breast milk and breast feeding isn't recommended.
Albendazole Pregnancy And Lactation Text: This medication is Pregnancy Category C and it isn't known if it is safe during pregnancy. It is also excreted in breast milk.
Rituxan Pregnancy And Lactation Text: This medication is Pregnancy Category C and it isn't know if it is safe during pregnancy. It is unknown if this medication is excreted in breast milk but similar antibodies are known to be excreted.
Rhofade Counseling: Rhofade is a topical medication which can decrease superficial blood flow where applied. Side effects are uncommon and include stinging, redness and allergic reactions.
Methotrexate Counseling:  Patient counseled regarding adverse effects of methotrexate including but not limited to nausea, vomiting, abnormalities in liver function tests. Patients may develop mouth sores, rash, diarrhea, and abnormalities in blood counts. The patient understands that monitoring is required including LFT's and blood counts.  There is a rare possibility of scarring of the liver and lung problems that can occur when taking methotrexate. Persistent nausea, loss of appetite, pale stools, dark urine, cough, and shortness of breath should be reported immediately. Patient advised to discontinue methotrexate treatment at least three months before attempting to become pregnant.  I discussed the need for folate supplements while taking methotrexate.  These supplements can decrease side effects during methotrexate treatment. The patient verbalized understanding of the proper use and possible adverse effects of methotrexate.  All of the patient's questions and concerns were addressed.
Ketoconazole Counseling:   Patient counseled regarding improving absorption with orange juice.  Adverse effects include but are not limited to breast enlargement, headache, diarrhea, nausea, upset stomach, liver function test abnormalities, taste disturbance, and stomach pain.  There is a rare possibility of liver failure that can occur when taking ketoconazole. The patient understands that monitoring of LFTs may be required, especially at baseline. The patient verbalized understanding of the proper use and possible adverse effects of ketoconazole.  All of the patient's questions and concerns were addressed.
Elidel Counseling: Patient may experience a mild burning sensation during topical application. Elidel is not approved in children less than 2 years of age. There have been case reports of hematologic and skin malignancies in patients using topical calcineurin inhibitors although causality is questionable.
Glycopyrrolate Counseling:  I discussed with the patient the risks of glycopyrrolate including but not limited to skin rash, drowsiness, dry mouth, difficulty urinating, and blurred vision.
Sotyktu Pregnancy And Lactation Text: There is insufficient data to evaluate whether or not Sotyktu is safe to use during pregnancy.   It is not known if Sotyktu passes into breast milk and whether or not it is safe to use when breastfeeding.  
Siliq Pregnancy And Lactation Text: The risk during pregnancy and breastfeeding is uncertain with this medication.
Griseofulvin Pregnancy And Lactation Text: This medication is Pregnancy Category X and is known to cause serious birth defects. It is unknown if this medication is excreted in breast milk but breast feeding should be avoided.
Winlevi Pregnancy And Lactation Text: This medication is considered safe during pregnancy and breastfeeding.
Drysol Counseling:  I discussed with the patient the risks of drysol/aluminum chloride including but not limited to skin rash, itching, irritation, burning.
Xeljanz Counseling: I discussed with the patient the risks of Xeljanz therapy including increased risk of infection, liver issues, headache, diarrhea, or cold symptoms. Live vaccines should be avoided. They were instructed to call if they have any problems.
Cephalexin Pregnancy And Lactation Text: This medication is Pregnancy Category B and considered safe during pregnancy.  It is also excreted in breast milk but can be used safely for shorter doses.
Bexarotene Counseling:  I discussed with the patient the risks of bexarotene including but not limited to hair loss, dry lips/skin/eyes, liver abnormalities, hyperlipidemia, pancreatitis, depression/suicidal ideation, photosensitivity, drug rash/allergic reactions, hypothyroidism, anemia, leukopenia, infection, cataracts, and teratogenicity.  Patient understands that they will need regular blood tests to check lipid profile, liver function tests, white blood cell count, thyroid function tests and pregnancy test if applicable.
Simponi Counseling:  I discussed with the patient the risks of golimumab including but not limited to myelosuppression, immunosuppression, autoimmune hepatitis, demyelinating diseases, lymphoma, and serious infections.  The patient understands that monitoring is required including a PPD at baseline and must alert us or the primary physician if symptoms of infection or other concerning signs are noted.
Cellcept Pregnancy And Lactation Text: This medication is Pregnancy Category D and isn't considered safe during pregnancy. It is unknown if this medication is excreted in breast milk.
Cyclophosphamide Counseling:  I discussed with the patient the risks of cyclophosphamide including but not limited to hair loss, hormonal abnormalities, decreased fertility, abdominal pain, diarrhea, nausea and vomiting, bone marrow suppression and infection. The patient understands that monitoring is required while taking this medication.
Adbry Pregnancy And Lactation Text: It is unknown if this medication will adversely affect pregnancy or breast feeding.
Mirvaso Counseling: Mirvaso is a topical medication which can decrease superficial blood flow where applied. Side effects are uncommon and include stinging, redness and allergic reactions.
Cibinqo Counseling: I discussed with the patient the risks of Cibinqo therapy including but not limited to common cold, nausea, headache, cold sores, increased blood CPK levels, dizziness, UTIs, fatigue, acne, and vomitting. Live vaccines should be avoided.  This medication has been linked to serious infections; higher rate of mortality; malignancy and lymphoproliferative disorders; major adverse cardiovascular events; thrombosis; thrombocytopenia and lymphopenia; lipid elevations; and retinal detachment.
Stelara Counseling:  I discussed with the patient the risks of ustekinumab including but not limited to immunosuppression, malignancy, posterior leukoencephalopathy syndrome, and serious infections.  The patient understands that monitoring is required including a PPD at baseline and must alert us or the primary physician if symptoms of infection or other concerning signs are noted.
Otezla Pregnancy And Lactation Text: This medication is Pregnancy Category C and it isn't known if it is safe during pregnancy. It is unknown if it is excreted in breast milk.
Olumiant Pregnancy And Lactation Text: Based on animal studies, Olumiant may cause embryo-fetal harm when administered to pregnant women.  The medication should not be used in pregnancy.  Breastfeeding is not recommended during treatment.
Opzelura Pregnancy And Lactation Text: There is insufficient data to evaluate drug-associated risk for major birth defects, miscarriage, or other adverse maternal or fetal outcomes.  There is a pregnancy registry that monitors pregnancy outcomes in pregnant persons exposed to the medication during pregnancy.  It is unknown if this medication is excreted in breast milk.  Do not breastfeed during treatment and for about 4 weeks after the last dose.
Siliq Counseling:  I discussed with the patient the risks of Siliq including but not limited to new or worsening depression, suicidal thoughts and behavior, immunosuppression, malignancy, posterior leukoencephalopathy syndrome, and serious infections.  The patient understands that monitoring is required including a PPD at baseline and must alert us or the primary physician if symptoms of infection or other concerning signs are noted. There is also a special program designed to monitor depression which is required with Siliq.
Hydroxyzine Counseling: Patient advised that the medication is sedating and not to drive a car after taking this medication.  Patient informed of potential adverse effects including but not limited to dry mouth, urinary retention, and blurry vision.  The patient verbalized understanding of the proper use and possible adverse effects of hydroxyzine.  All of the patient's questions and concerns were addressed.
Ivermectin Counseling:  Patient instructed to take medication on an empty stomach with a full glass of water.  Patient informed of potential adverse effects including but not limited to nausea, diarrhea, dizziness, itching, and swelling of the extremities or lymph nodes.  The patient verbalized understanding of the proper use and possible adverse effects of ivermectin.  All of the patient's questions and concerns were addressed.
Calcipotriene Pregnancy And Lactation Text: This medication has not been proven safe during pregnancy. It is unknown if this medication is excreted in breast milk.
Tremfya Counseling: I discussed with the patient the risks of guselkumab including but not limited to immunosuppression, serious infections, worsening of inflammatory bowel disease and drug reactions.  The patient understands that monitoring is required including a PPD at baseline and must alert us or the primary physician if symptoms of infection or other concerning signs are noted.
Metronidazole Pregnancy And Lactation Text: This medication is Pregnancy Category B and considered safe during pregnancy.  It is also excreted in breast milk.
Solaraze Pregnancy And Lactation Text: This medication is Pregnancy Category B and is considered safe. There is some data to suggest avoiding during the third trimester. It is unknown if this medication is excreted in breast milk.
Rifampin Counseling: I discussed with the patient the risks of rifampin including but not limited to liver damage, kidney damage, red-orange body fluids, nausea/vomiting and severe allergy.
Thalidomide Counseling: I discussed with the patient the risks of thalidomide including but not limited to birth defects, anxiety, weakness, chest pain, dizziness, cough and severe allergy.
Dutasteride Pregnancy And Lactation Text: This medication is absolutely contraindicated in women, especially during pregnancy and breast feeding. Feminization of male fetuses is possible if taking while pregnant.
Sotyktu Counseling:  I discussed the most common side effects of Sotyktu including: common cold, sore throat, sinus infections, cold sores, canker sores, folliculitis, and acne.  I also discussed more serious side effects of Sotyktu including but not limited to: serious allergic reactions; increased risk for infections such as TB; cancers such as lymphomas; rhabdomyolysis and elevated CPK; and elevated triglycerides and liver enzymes. 
Acitretin Pregnancy And Lactation Text: This medication is Pregnancy Category X and should not be given to women who are pregnant or may become pregnant in the future. This medication is excreted in breast milk.
Tranexamic Acid Pregnancy And Lactation Text: It is unknown if this medication is safe during pregnancy or breast feeding.
Hydroquinone Pregnancy And Lactation Text: This medication has not been assigned a Pregnancy Risk Category but animal studies failed to show danger with the topical medication. It is unknown if the medication is excreted in breast milk.
Oral Minoxidil Counseling- I discussed with the patient the risks of oral minoxidil including but not limited to shortness of breath, swelling of the feet or ankles, dizziness, lightheadedness, unwanted hair growth and allergic reaction.  The patient verbalized understanding of the proper use and possible adverse effects of oral minoxidil.  All of the patient's questions and concerns were addressed.
Tazorac Counseling:  Patient advised that medication is irritating and drying.  Patient may need to apply sparingly and wash off after an hour before eventually leaving it on overnight.  The patient verbalized understanding of the proper use and possible adverse effects of tazorac.  All of the patient's questions and concerns were addressed.
Carac Counseling:  I discussed with the patient the risks of Carac including but not limited to erythema, scaling, itching, weeping, crusting, and pain.
Tazorac Pregnancy And Lactation Text: This medication is not safe during pregnancy. It is unknown if this medication is excreted in breast milk.
Birth Control Pills Counseling: Birth Control Pill Counseling: I discussed with the patient the potential side effects of OCPs including but not limited to increased risk of stroke, heart attack, thrombophlebitis, deep venous thrombosis, hepatic adenomas, breast changes, GI upset, headaches, and depression.  The patient verbalized understanding of the proper use and possible adverse effects of OCPs. All of the patient's questions and concerns were addressed.
Methotrexate Pregnancy And Lactation Text: This medication is Pregnancy Category X and is known to cause fetal harm. This medication is excreted in breast milk.
Niacinamide Pregnancy And Lactation Text: These medications are considered safe during pregnancy.
Griseofulvin Counseling:  I discussed with the patient the risks of griseofulvin including but not limited to photosensitivity, cytopenia, liver damage, nausea/vomiting and severe allergy.  The patient understands that this medication is best absorbed when taken with a fatty meal (e.g., ice cream or french fries).
Nsaids Counseling: NSAID Counseling: I discussed with the patient that NSAIDs should be taken with food. Prolonged use of NSAIDs can result in the development of stomach ulcers.  Patient advised to stop taking NSAIDs if abdominal pain occurs.  The patient verbalized understanding of the proper use and possible adverse effects of NSAIDs.  All of the patient's questions and concerns were addressed.
Olanzapine Counseling- I discussed with the patient the common side effects of olanzapine including but are not limited to: lack of energy, dry mouth, increased appetite, sleepiness, tremor, constipation, dizziness, changes in behavior, or restlessness.  Explained that teenagers are more likely to experience headaches, abdominal pain, pain in the arms or legs, tiredness, and sleepiness.  Serious side effects include but are not limited: increased risk of death in elderly patients who are confused, have memory loss, or dementia-related psychosis; hyperglycemia; increased cholesterol and triglycerides; and weight gain.
Clindamycin Pregnancy And Lactation Text: This medication can be used in pregnancy if certain situations. Clindamycin is also present in breast milk.
Azelaic Acid Counseling: Patient counseled that medicine may cause skin irritation and to avoid applying near the eyes.  In the event of skin irritation, the patient was advised to reduce the amount of the drug applied or use it less frequently.   The patient verbalized understanding of the proper use and possible adverse effects of azelaic acid.  All of the patient's questions and concerns were addressed.
Eucrisa Counseling: Patient may experience a mild burning sensation during topical application. Eucrisa is not approved in children less than 2 years of age.
Ilumya Counseling: I discussed with the patient the risks of tildrakizumab including but not limited to immunosuppression, malignancy, posterior leukoencephalopathy syndrome, and serious infections.  The patient understands that monitoring is required including a PPD at baseline and must alert us or the primary physician if symptoms of infection or other concerning signs are noted.
Xolair Counseling:  Patient informed of potential adverse effects including but not limited to fever, muscle aches, rash and allergic reactions.  The patient verbalized understanding of the proper use and possible adverse effects of Xolair.  All of the patient's questions and concerns were addressed.
Xolair Pregnancy And Lactation Text: This medication is Pregnancy Category B and is considered safe during pregnancy. This medication is excreted in breast milk.
Infliximab Counseling:  I discussed with the patient the risks of infliximab including but not limited to myelosuppression, immunosuppression, autoimmune hepatitis, demyelinating diseases, lymphoma, and serious infections.  The patient understands that monitoring is required including a PPD at baseline and must alert us or the primary physician if symptoms of infection or other concerning signs are noted.
Cimzia Counseling:  I discussed with the patient the risks of Cimzia including but not limited to immunosuppression, allergic reactions and infections.  The patient understands that monitoring is required including a PPD at baseline and must alert us or the primary physician if symptoms of infection or other concerning signs are noted.
Azithromycin Counseling:  I discussed with the patient the risks of azithromycin including but not limited to GI upset, allergic reaction, drug rash, diarrhea, and yeast infections.
Taltz Counseling: I discussed with the patient the risks of ixekizumab including but not limited to immunosuppression, serious infections, worsening of inflammatory bowel disease and drug reactions.  The patient understands that monitoring is required including a PPD at baseline and must alert us or the primary physician if symptoms of infection or other concerning signs are noted.
Clindamycin Counseling: I counseled the patient regarding use of clindamycin as an antibiotic for prophylactic and/or therapeutic purposes. Clindamycin is active against numerous classes of bacteria, including skin bacteria. Side effects may include nausea, diarrhea, gastrointestinal upset, rash, hives, yeast infections, and in rare cases, colitis.
Valtrex Pregnancy And Lactation Text: this medication is Pregnancy Category B and is considered safe during pregnancy. This medication is not directly found in breast milk but it's metabolite acyclovir is present.
Cyclophosphamide Pregnancy And Lactation Text: This medication is Pregnancy Category D and it isn't considered safe during pregnancy. This medication is excreted in breast milk.
Minocycline Counseling: Patient advised regarding possible photosensitivity and discoloration of the teeth, skin, lips, tongue and gums.  Patient instructed to avoid sunlight, if possible.  When exposed to sunlight, patients should wear protective clothing, sunglasses, and sunscreen.  The patient was instructed to call the office immediately if the following severe adverse effects occur:  hearing changes, easy bruising/bleeding, severe headache, or vision changes.  The patient verbalized understanding of the proper use and possible adverse effects of minocycline.  All of the patient's questions and concerns were addressed.
Cyclosporine Pregnancy And Lactation Text: This medication is Pregnancy Category C and it isn't know if it is safe during pregnancy. This medication is excreted in breast milk.
Topical Sulfur Applications Pregnancy And Lactation Text: This medication is Pregnancy Category C and has an unknown safety profile during pregnancy. It is unknown if this topical medication is excreted in breast milk.
5-Fu Counseling: 5-Fluorouracil Counseling:  I discussed with the patient the risks of 5-fluorouracil including but not limited to erythema, scaling, itching, weeping, crusting, and pain.
Spironolactone Counseling: Patient advised regarding risks of diarrhea, abdominal pain, hyperkalemia, birth defects (for female patients), liver toxicity and renal toxicity. The patient may need blood work to monitor liver and kidney function and potassium levels while on therapy. The patient verbalized understanding of the proper use and possible adverse effects of spironolactone.  All of the patient's questions and concerns were addressed.
Quinolones Counseling:  I discussed with the patient the risks of fluoroquinolones including but not limited to GI upset, allergic reaction, drug rash, diarrhea, dizziness, photosensitivity, yeast infections, liver function test abnormalities, tendonitis/tendon rupture.
Picato Counseling:  I discussed with the patient the risks of Picato including but not limited to erythema, scaling, itching, weeping, crusting, and pain.
Bactrim Pregnancy And Lactation Text: This medication is Pregnancy Category D and is known to cause fetal risk.  It is also excreted in breast milk.
Opioid Pregnancy And Lactation Text: These medications can lead to premature delivery and should be avoided during pregnancy. These medications are also present in breast milk in small amounts.
Minoxidil Counseling: Minoxidil is a topical medication which can increase blood flow where it is applied. It is uncertain how this medication increases hair growth. Side effects are uncommon and include stinging and allergic reactions.
Opioid Counseling: I discussed with the patient the potential side effects of opioids including but not limited to addiction, altered mental status, and depression. I stressed avoiding alcohol, benzodiazepines, muscle relaxants and sleep aids unless specifically okayed by a physician. The patient verbalized understanding of the proper use and possible adverse effects of opioids. All of the patient's questions and concerns were addressed. They were instructed to flush the remaining pills down the toilet if they did not need them for pain.
Skyrizi Counseling: I discussed with the patient the risks of risankizumab-rzaa including but not limited to immunosuppression, and serious infections.  The patient understands that monitoring is required including a PPD at baseline and must alert us or the primary physician if symptoms of infection or other concerning signs are noted.
Topical Sulfur Applications Counseling: Topical Sulfur Counseling: Patient counseled that this medication may cause skin irritation or allergic reactions.  In the event of skin irritation, the patient was advised to reduce the amount of the drug applied or use it less frequently.   The patient verbalized understanding of the proper use and possible adverse effects of topical sulfur application.  All of the patient's questions and concerns were addressed.
Sarecycline Counseling: Patient advised regarding possible photosensitivity and discoloration of the teeth, skin, lips, tongue and gums.  Patient instructed to avoid sunlight, if possible.  When exposed to sunlight, patients should wear protective clothing, sunglasses, and sunscreen.  The patient was instructed to call the office immediately if the following severe adverse effects occur:  hearing changes, easy bruising/bleeding, severe headache, or vision changes.  The patient verbalized understanding of the proper use and possible adverse effects of sarecycline.  All of the patient's questions and concerns were addressed.
Hydroxyzine Pregnancy And Lactation Text: This medication is not safe during pregnancy and should not be taken. It is also excreted in breast milk and breast feeding isn't recommended.
Oxybutynin Counseling:  I discussed with the patient the risks of oxybutynin including but not limited to skin rash, drowsiness, dry mouth, difficulty urinating, and blurred vision.
Clofazimine Counseling:  I discussed with the patient the risks of clofazimine including but not limited to skin and eye pigmentation, liver damage, nausea/vomiting, gastrointestinal bleeding and allergy.
Topical Clindamycin Counseling: Patient counseled that this medication may cause skin irritation or allergic reactions.  In the event of skin irritation, the patient was advised to reduce the amount of the drug applied or use it less frequently.   The patient verbalized understanding of the proper use and possible adverse effects of clindamycin.  All of the patient's questions and concerns were addressed.
Topical Retinoid counseling:  Patient advised to apply a pea-sized amount only at bedtime and wait 30 minutes after washing their face before applying.  If too drying, patient may add a non-comedogenic moisturizer. The patient verbalized understanding of the proper use and possible adverse effects of retinoids.  All of the patient's questions and concerns were addressed.
Tranexamic Acid Counseling:  Patient advised of the small risk of bleeding problems with tranexamic acid. They were also instructed to call if they developed any nausea, vomiting or diarrhea. All of the patient's questions and concerns were addressed.
Finasteride Pregnancy And Lactation Text: This medication is absolutely contraindicated during pregnancy. It is unknown if it is excreted in breast milk.
Dapsone Counseling: I discussed with the patient the risks of dapsone including but not limited to hemolytic anemia, agranulocytosis, rashes, methemoglobinemia, kidney failure, peripheral neuropathy, headaches, GI upset, and liver toxicity.  Patients who start dapsone require monitoring including baseline LFTs and weekly CBCs for the first month, then every month thereafter.  The patient verbalized understanding of the proper use and possible adverse effects of dapsone.  All of the patient's questions and concerns were addressed.
Azithromycin Pregnancy And Lactation Text: This medication is considered safe during pregnancy and is also secreted in breast milk.
Olumiant Counseling: I discussed with the patient the risks of Olumiant therapy including but not limited to upper respiratory tract infections, shingles, cold sores, and nausea. Live vaccines should be avoided.  This medication has been linked to serious infections; higher rate of mortality; malignancy and lymphoproliferative disorders; major adverse cardiovascular events; thrombosis; gastrointestinal perforations; neutropenia; lymphopenia; anemia; liver enzyme elevations; and lipid elevations.
Albendazole Counseling:  I discussed with the patient the risks of albendazole including but not limited to cytopenia, kidney damage, nausea/vomiting and severe allergy.  The patient understands that this medication is being used in an off-label manner.
Hydroxychloroquine Counseling:  I discussed with the patient that a baseline ophthalmologic exam is needed at the start of therapy and every year thereafter while on therapy. A CBC may also be warranted for monitoring.  The side effects of this medication were discussed with the patient, including but not limited to agranulocytosis, aplastic anemia, seizures, rashes, retinopathy, and liver toxicity. Patient instructed to call the office should any adverse effect occur.  The patient verbalized understanding of the proper use and possible adverse effects of Plaquenil.  All the patient's questions and concerns were addressed.
Calcipotriene Counseling:  I discussed with the patient the risks of calcipotriene including but not limited to erythema, scaling, itching, and irritation.
Erivedge Pregnancy And Lactation Text: This medication is Pregnancy Category X and is absolutely contraindicated during pregnancy. It is unknown if it is excreted in breast milk.
Libtayo Counseling- I discussed with the patient the risks of Libtayo including but not limited to nausea, vomiting, diarrhea, and bone or muscle pain.  The patient verbalized understanding of the proper use and possible adverse effects of Libtayo.  All of the patient's questions and concerns were addressed.
High Dose Vitamin A Counseling: Side effects reviewed, pt to contact office should one occur.
Wartpeel Counseling:  I discussed with the patient the risks of Wartpeel including but not limited to erythema, scaling, itching, weeping, crusting, and pain.
Olanzapine Pregnancy And Lactation Text: This medication is pregnancy category C.   There are no adequate and well controlled trials with olanzapine in pregnant females.  Olanzapine should be used during pregnancy only if the potential benefit justifies the potential risk to the fetus.   In a study in lactating healthy women, olanzapine was excreted in breast milk.  It is recommended that women taking olanzapine should not breast feed.
Isotretinoin Counseling: Patient should get monthly blood tests, not donate blood, not drive at night if vision affected, not share medication, and not undergo elective surgery for 6 months after tx completed. Side effects reviewed, pt to contact office should one occur.
Erivedge Counseling- I discussed with the patient the risks of Erivedge including but not limited to nausea, vomiting, diarrhea, constipation, weight loss, changes in the sense of taste, decreased appetite, muscle spasms, and hair loss.  The patient verbalized understanding of the proper use and possible adverse effects of Erivedge.  All of the patient's questions and concerns were addressed.
Dupixent Counseling: I discussed with the patient the risks of dupilumab including but not limited to eye infection and irritation, cold sores, injection site reactions, worsening of asthma, allergic reactions and increased risk of parasitic infection.  Live vaccines should be avoided while taking dupilumab. Dupilumab will also interact with certain medications such as warfarin and cyclosporine. The patient understands that monitoring is required and they must alert us or the primary physician if symptoms of infection or other concerning signs are noted.
Cyclosporine Counseling:  I discussed with the patient the risks of cyclosporine including but not limited to hypertension, gingival hyperplasia,myelosuppression, immunosuppression, liver damage, kidney damage, neurotoxicity, lymphoma, and serious infections. The patient understands that monitoring is required including baseline blood pressure, CBC, CMP, lipid panel and uric acid, and then 1-2 times monthly CMP and blood pressure.
Colchicine Pregnancy And Lactation Text: This medication is Pregnancy Category C and isn't considered safe during pregnancy. It is excreted in breast milk.
Acitretin Counseling:  I discussed with the patient the risks of acitretin including but not limited to hair loss, dry lips/skin/eyes, liver damage, hyperlipidemia, depression/suicidal ideation, photosensitivity.  Serious rare side effects can include but are not limited to pancreatitis, pseudotumor cerebri, bony changes, clot formation/stroke/heart attack.  Patient understands that alcohol is contraindicated since it can result in liver toxicity and significantly prolong the elimination of the drug by many years.
Finasteride Male Counseling: Finasteride Counseling:  I discussed with the patient the risks of use of finasteride including but not limited to decreased libido, decreased ejaculate volume, gynecomastia, and depression. Women should not handle medication.  All of the patient's questions and concerns were addressed.
High Dose Vitamin A Pregnancy And Lactation Text: High dose vitamin A therapy is contraindicated during pregnancy and breast feeding.
Otezla Counseling: The side effects of Otezla were discussed with the patient, including but not limited to worsening or new depression, weight loss, diarrhea, nausea, upper respiratory tract infection, and headache. Patient instructed to call the office should any adverse effect occur.  The patient verbalized understanding of the proper use and possible adverse effects of Otezla.  All the patient's questions and concerns were addressed.
Benzoyl Peroxide Pregnancy And Lactation Text: This medication is Pregnancy Category C. It is unknown if benzoyl peroxide is excreted in breast milk.
Bactrim Counseling:  I discussed with the patient the risks of sulfa antibiotics including but not limited to GI upset, allergic reaction, drug rash, diarrhea, dizziness, photosensitivity, and yeast infections.  Rarely, more serious reactions can occur including but not limited to aplastic anemia, agranulocytosis, methemoglobinemia, blood dyscrasias, liver or kidney failure, lung infiltrates or desquamative/blistering drug rashes.
Xelsandiz Pregnancy And Lactation Text: This medication is Pregnancy Category D and is not considered safe during pregnancy.  The risk during breast feeding is also uncertain.
Prednisone Counseling:  I discussed with the patient the risks of prolonged use of prednisone including but not limited to weight gain, insomnia, osteoporosis, mood changes, diabetes, susceptibility to infection, glaucoma and high blood pressure.  In cases where prednisone use is prolonged, patients should be monitored with blood pressure checks, serum glucose levels and an eye exam.  Additionally, the patient may need to be placed on GI prophylaxis, PCP prophylaxis, and calcium and vitamin D supplementation and/or a bisphosphonate.  The patient verbalized understanding of the proper use and the possible adverse effects of prednisone.  All of the patient's questions and concerns were addressed.
Detail Level: Detailed
Erythromycin Pregnancy And Lactation Text: This medication is Pregnancy Category B and is considered safe during pregnancy. It is also excreted in breast milk.
Niacinamide Counseling: I recommended taking niacin or niacinamide, also know as vitamin B3, twice daily. Recent evidence suggests that taking vitamin B3 (500 mg twice daily) can reduce the risk of actinic keratoses and non-melanoma skin cancers. Side effects of vitamin B3 include flushing and headache.
Birth Control Pills Pregnancy And Lactation Text: This medication should be avoided if pregnant and for the first 30 days post-partum.
Doxycycline Counseling:  Patient counseled regarding possible photosensitivity and increased risk for sunburn.  Patient instructed to avoid sunlight, if possible.  When exposed to sunlight, patients should wear protective clothing, sunglasses, and sunscreen.  The patient was instructed to call the office immediately if the following severe adverse effects occur:  hearing changes, easy bruising/bleeding, severe headache, or vision changes.  The patient verbalized understanding of the proper use and possible adverse effects of doxycycline.  All of the patient's questions and concerns were addressed.
Hydroxychloroquine Pregnancy And Lactation Text: This medication has been shown to cause fetal harm but it isn't assigned a Pregnancy Risk Category. There are small amounts excreted in breast milk.
Metronidazole Counseling:  I discussed with the patient the risks of metronidazole including but not limited to seizures, nausea/vomiting, a metallic taste in the mouth, nausea/vomiting and severe allergy.
Isotretinoin Pregnancy And Lactation Text: This medication is Pregnancy Category X and is considered extremely dangerous during pregnancy. It is unknown if it is excreted in breast milk.
Rituxan Counseling:  I discussed with the patient the risks of Rituxan infusions. Side effects can include infusion reactions, severe drug rashes including mucocutaneous reactions, reactivation of latent hepatitis and other infections and rarely progressive multifocal leukoencephalopathy.  All of the patient's questions and concerns were addressed.
Dupixent Pregnancy And Lactation Text: This medication likely crosses the placenta but the risk for the fetus is uncertain. This medication is excreted in breast milk.
Ketoconazole Pregnancy And Lactation Text: This medication is Pregnancy Category C and it isn't know if it is safe during pregnancy. It is also excreted in breast milk and breast feeding isn't recommended.
Cimzia Pregnancy And Lactation Text: This medication crosses the placenta but can be considered safe in certain situations. Cimzia may be excreted in breast milk.
Propranolol Pregnancy And Lactation Text: This medication is Pregnancy Category C and it isn't known if it is safe during pregnancy. It is excreted in breast milk.
Azathioprine Counseling:  I discussed with the patient the risks of azathioprine including but not limited to myelosuppression, immunosuppression, hepatotoxicity, lymphoma, and infections.  The patient understands that monitoring is required including baseline LFTs, Creatinine, possible TPMP genotyping and weekly CBCs for the first month and then every 2 weeks thereafter.  The patient verbalized understanding of the proper use and possible adverse effects of azathioprine.  All of the patient's questions and concerns were addressed.
Fluconazole Counseling:  Patient counseled regarding adverse effects of fluconazole including but not limited to headache, diarrhea, nausea, upset stomach, liver function test abnormalities, taste disturbance, and stomach pain.  There is a rare possibility of liver failure that can occur when taking fluconazole.  The patient understands that monitoring of LFTs and kidney function test may be required, especially at baseline. The patient verbalized understanding of the proper use and possible adverse effects of fluconazole.  All of the patient's questions and concerns were addressed.
Dutasteride Male Counseling: Dustasteride Counseling:  I discussed with the patient the risks of use of dutasteride including but not limited to decreased libido, decreased ejaculate volume, and gynecomastia. Women who can become pregnant should not handle medication.  All of the patient's questions and concerns were addressed.
Itraconazole Counseling:  I discussed with the patient the risks of itraconazole including but not limited to liver damage, nausea/vomiting, neuropathy, and severe allergy.  The patient understands that this medication is best absorbed when taken with acidic beverages such as non-diet cola or ginger ale.  The patient understands that monitoring is required including baseline LFTs and repeat LFTs at intervals.  The patient understands that they are to contact us or the primary physician if concerning signs are noted.
Tetracycline Counseling: Patient counseled regarding possible photosensitivity and increased risk for sunburn.  Patient instructed to avoid sunlight, if possible.  When exposed to sunlight, patients should wear protective clothing, sunglasses, and sunscreen.  The patient was instructed to call the office immediately if the following severe adverse effects occur:  hearing changes, easy bruising/bleeding, severe headache, or vision changes.  The patient verbalized understanding of the proper use and possible adverse effects of tetracycline.  All of the patient's questions and concerns were addressed. Patient understands to avoid pregnancy while on therapy due to potential birth defects.

## (undated) DEVICE — GLOVE SENSICARE SLT 6.0 SURG

## (undated) DEVICE — SUTURE VICRYL 0 CT 36" VIOLET

## (undated) DEVICE — DRESSING ISLAND 4X14" SURGICAL TELFA

## (undated) DEVICE — SUTURE MONOCRYL 4-0 PS-2 18" UNDYED

## (undated) DEVICE — GLOVE SENSICARE GREEN 6.5 SURG @

## (undated) DEVICE — DRESSING 8X10" ABD PAD CURITY STL

## (undated) DEVICE — SUCTION YANKAUER

## (undated) DEVICE — SUTURE PLAIN GUT 1 54" TIE